# Patient Record
Sex: MALE | Race: WHITE
[De-identification: names, ages, dates, MRNs, and addresses within clinical notes are randomized per-mention and may not be internally consistent; named-entity substitution may affect disease eponyms.]

---

## 2018-03-24 ENCOUNTER — HOSPITAL ENCOUNTER (INPATIENT)
Dept: HOSPITAL 47 - EC | Age: 63
LOS: 3 days | Discharge: HOME | DRG: 246 | End: 2018-03-27
Payer: COMMERCIAL

## 2018-03-24 VITALS — BODY MASS INDEX: 40.6 KG/M2

## 2018-03-24 DIAGNOSIS — I50.23: ICD-10-CM

## 2018-03-24 DIAGNOSIS — Z82.49: ICD-10-CM

## 2018-03-24 DIAGNOSIS — I48.1: ICD-10-CM

## 2018-03-24 DIAGNOSIS — I25.5: ICD-10-CM

## 2018-03-24 DIAGNOSIS — Z79.899: ICD-10-CM

## 2018-03-24 DIAGNOSIS — Z79.82: ICD-10-CM

## 2018-03-24 DIAGNOSIS — E78.5: ICD-10-CM

## 2018-03-24 DIAGNOSIS — I11.9: ICD-10-CM

## 2018-03-24 DIAGNOSIS — Z79.02: ICD-10-CM

## 2018-03-24 DIAGNOSIS — E66.01: ICD-10-CM

## 2018-03-24 DIAGNOSIS — M19.90: ICD-10-CM

## 2018-03-24 DIAGNOSIS — I48.2: ICD-10-CM

## 2018-03-24 DIAGNOSIS — I21.4: Primary | ICD-10-CM

## 2018-03-24 DIAGNOSIS — Z79.01: ICD-10-CM

## 2018-03-24 DIAGNOSIS — G47.30: ICD-10-CM

## 2018-03-24 LAB
ALBUMIN SERPL-MCNC: 3.9 G/DL (ref 3.5–5)
ALP SERPL-CCNC: 113 U/L (ref 38–126)
ALT SERPL-CCNC: 35 U/L (ref 21–72)
ANION GAP SERPL CALC-SCNC: 13 MMOL/L
APTT BLD: 30.6 SEC (ref 22–30)
AST SERPL-CCNC: 104 U/L (ref 17–59)
BASOPHILS # BLD AUTO: 0.1 K/UL (ref 0–0.2)
BASOPHILS NFR BLD AUTO: 1 %
BUN SERPL-SCNC: 17 MG/DL (ref 9–20)
CALCIUM SPEC-MCNC: 9.5 MG/DL (ref 8.4–10.2)
CHLORIDE SERPL-SCNC: 107 MMOL/L (ref 98–107)
CK SERPL-CCNC: 577 U/L (ref 55–170)
CK SERPL-CCNC: 702 U/L (ref 55–170)
CK SERPL-CCNC: 810 U/L (ref 55–170)
CO2 SERPL-SCNC: 24 MMOL/L (ref 22–30)
D DIMER PPP FEU-MCNC: 0.4 MG/L FEU (ref ?–0.6)
EOSINOPHIL # BLD AUTO: 0.1 K/UL (ref 0–0.7)
EOSINOPHIL NFR BLD AUTO: 1 %
ERYTHROCYTE [DISTWIDTH] IN BLOOD BY AUTOMATED COUNT: 5.21 M/UL (ref 4.3–5.9)
ERYTHROCYTE [DISTWIDTH] IN BLOOD: 13.2 % (ref 11.5–15.5)
GLUCOSE SERPL-MCNC: 167 MG/DL (ref 74–99)
HCT VFR BLD AUTO: 43.1 % (ref 39–53)
HGB BLD-MCNC: 15.2 GM/DL (ref 13–17.5)
INR PPP: 2.1 (ref ?–1.2)
LYMPHOCYTES # SPEC AUTO: 1.7 K/UL (ref 1–4.8)
LYMPHOCYTES NFR SPEC AUTO: 18 %
MAGNESIUM SPEC-SCNC: 2 MG/DL (ref 1.6–2.3)
MCH RBC QN AUTO: 29.1 PG (ref 25–35)
MCHC RBC AUTO-ENTMCNC: 35.1 G/DL (ref 31–37)
MCV RBC AUTO: 82.9 FL (ref 80–100)
MONOCYTES # BLD AUTO: 0.7 K/UL (ref 0–1)
MONOCYTES NFR BLD AUTO: 8 %
NEUTROPHILS # BLD AUTO: 7 K/UL (ref 1.3–7.7)
NEUTROPHILS NFR BLD AUTO: 71 %
PLATELET # BLD AUTO: 319 K/UL (ref 150–450)
POTASSIUM SERPL-SCNC: 4.5 MMOL/L (ref 3.5–5.1)
PROT SERPL-MCNC: 7.1 G/DL (ref 6.3–8.2)
PT BLD: 19.1 SEC (ref 9–12)
SODIUM SERPL-SCNC: 144 MMOL/L (ref 137–145)
TROPONIN I SERPL-MCNC: 12.9 NG/ML (ref 0–0.03)
TROPONIN I SERPL-MCNC: 13.2 NG/ML (ref 0–0.03)
TROPONIN I SERPL-MCNC: 19.1 NG/ML (ref 0–0.03)
WBC # BLD AUTO: 9.8 K/UL (ref 3.8–10.6)

## 2018-03-24 PROCEDURE — 80061 LIPID PANEL: CPT

## 2018-03-24 PROCEDURE — 82553 CREATINE MB FRACTION: CPT

## 2018-03-24 PROCEDURE — 93306 TTE W/DOPPLER COMPLETE: CPT

## 2018-03-24 PROCEDURE — 85610 PROTHROMBIN TIME: CPT

## 2018-03-24 PROCEDURE — 80048 BASIC METABOLIC PNL TOTAL CA: CPT

## 2018-03-24 PROCEDURE — 83735 ASSAY OF MAGNESIUM: CPT

## 2018-03-24 PROCEDURE — 93005 ELECTROCARDIOGRAM TRACING: CPT

## 2018-03-24 PROCEDURE — 027035Z DILATION OF CORONARY ARTERY, ONE ARTERY WITH TWO DRUG-ELUTING INTRALUMINAL DEVICES, PERCUTANEOUS APPROACH: ICD-10-PCS

## 2018-03-24 PROCEDURE — B2111ZZ FLUOROSCOPY OF MULTIPLE CORONARY ARTERIES USING LOW OSMOLAR CONTRAST: ICD-10-PCS

## 2018-03-24 PROCEDURE — 93458 L HRT ARTERY/VENTRICLE ANGIO: CPT

## 2018-03-24 PROCEDURE — 85379 FIBRIN DEGRADATION QUANT: CPT

## 2018-03-24 PROCEDURE — 82550 ASSAY OF CK (CPK): CPT

## 2018-03-24 PROCEDURE — 99291 CRITICAL CARE FIRST HOUR: CPT

## 2018-03-24 PROCEDURE — 4A023N7 MEASUREMENT OF CARDIAC SAMPLING AND PRESSURE, LEFT HEART, PERCUTANEOUS APPROACH: ICD-10-PCS

## 2018-03-24 PROCEDURE — 85730 THROMBOPLASTIN TIME PARTIAL: CPT

## 2018-03-24 PROCEDURE — 85025 COMPLETE CBC W/AUTO DIFF WBC: CPT

## 2018-03-24 PROCEDURE — 36415 COLL VENOUS BLD VENIPUNCTURE: CPT

## 2018-03-24 PROCEDURE — 84484 ASSAY OF TROPONIN QUANT: CPT

## 2018-03-24 PROCEDURE — 80053 COMPREHEN METABOLIC PANEL: CPT

## 2018-03-24 RX ADMIN — VERAPAMIL HYDROCHLORIDE ONE ML: 2.5 INJECTION, SOLUTION INTRAVENOUS at 12:20

## 2018-03-24 RX ADMIN — FUROSEMIDE SCH MG: 40 TABLET ORAL at 09:10

## 2018-03-24 RX ADMIN — CEFAZOLIN SCH MLS/HR: 330 INJECTION, POWDER, FOR SOLUTION INTRAMUSCULAR; INTRAVENOUS at 16:35

## 2018-03-24 RX ADMIN — NITROGLYCERIN SCH MLS/HR: 20 INJECTION INTRAVENOUS at 07:13

## 2018-03-24 RX ADMIN — VERAPAMIL HYDROCHLORIDE ONE ML: 2.5 INJECTION, SOLUTION INTRAVENOUS at 11:14

## 2018-03-24 RX ADMIN — THERA TABS SCH EACH: TAB at 09:09

## 2018-03-24 RX ADMIN — ATORVASTATIN CALCIUM SCH MG: 80 TABLET, FILM COATED ORAL at 21:00

## 2018-03-24 RX ADMIN — NITROGLYCERIN ONE MCG: 10 INJECTION INTRAVENOUS at 11:39

## 2018-03-24 RX ADMIN — NITROGLYCERIN SCH MLS: 20 INJECTION INTRAVENOUS at 11:15

## 2018-03-24 RX ADMIN — METOPROLOL TARTRATE SCH MG: 50 TABLET, FILM COATED ORAL at 21:01

## 2018-03-24 RX ADMIN — NITROGLYCERIN ONE MCG: 10 INJECTION INTRAVENOUS at 11:58

## 2018-03-24 NOTE — PTCA
PERCUTANEOUSTRANS CORORONARY ANGIOGRAPHY



ADDENDUM:



PCI PROCEDURE DETAILS:

The patient received only Angiomax infusion, not a bolus.  His INR was 2.1.  He

received 600 mg of Plavix orally.  I used an Xb 3.5 guide catheter.





VENU / LENY: [id number] / Job#: [id number]

MTDD

## 2018-03-24 NOTE — CC
CARDIAC CATHETERIZATION REPORT



DATE OF SERVICE:

03/24/2018.



PROCEDURES:

1. Left heart catheterization and coronary angiography.

2. Percutaneous transluminal coronary angioplasty and stenting of proximal circumflex

    marginal with a drug-eluting stent.

3. Percutaneous transluminal coronary angioplasty and stenting of mid left anterior

    descending coronary artery with a drug-eluting stent.

Moderate conscious sedation time was 72 minutes.  The patient was given Versed and

Benadryl combination.  His vital signs, EKG and oxygen saturation were monitored

closely.



CLINICAL INFORMATION:

Mr. Bradley Pedro is a 63-year-old gentleman, a  in [facility], a

patient of Dr. Jeronimo, who has chronic atrial fibrillation and probably some

cardiomyopathy-type picture, has been on Coumadin and Cardizem for rate control and

anticoagulation.  He has been doing well, but he came into the hospital with discomfort

in the chest and left armpit area, went to [facility], was transferred

here because of elevated troponin suggestive of non-ST elevation MI.  He was in atrial

fibrillation with a moderate ventricular rate, hemodynamically stable.  He was advised

a coronary angiography.  His INR was 2.1 and he was advised the procedure from the

radial approach and Dr. Dow saw and evaluated the patient and requested me to perform

coronary angiography, given the fact a radial approach would be better with a high INR.

I saw the patient, spoke to him at length regarding risks, benefits, options and

rationale and then proceeded with the procedure.



PROCEDURE NOTE:

Under local anesthesia and strict aseptic precautions, a 6-German introducer was placed

in the right radial artery.  Using an Ultimate 1 catheter, I performed selective

coronary angiography and using a pigtail catheter, I checked LV pressures, but did not

perform an LV-gram.  I noted that there was a significant disease in the circumflex

marginal which was subtotally occluded and also a mid LAD which had a high-grade lesion

and was a large vessel.  I recommend intervention in the same setting.  LV-gram was not

performed.



CARDIAC CATHETERIZATION FINDINGS:

The left ventricular end-diastolic pressure was about 20 mmHg without any gradient

across the aortic valve.



CORONARY ANGIOGRAPHY FINDINGS:

Right coronary artery:  Large dominant vessel, has minor irregularities, no more than

30% narrowing, distally bifurcates into a larger PDA.  A smaller PLV supplies a fair

amount of myocardium.  No significant disease.  RCA is a very dominant vessel.



Left main coronary artery:  Short, patent, disease-free vessel that bifurcates into LAD

and circumflex.



Left anterior descending coronary artery:  Good-caliber vessel, extends along the

anterior wall, gives off a small 1st diagonal and a large 2nd diagonal.  Immediately

after the large 2nd diagonal, there is an eccentric 80%-90% stenosis in a very

calcified area.  The caliber of the vessel then improves.  It runs all the way to the

apex supplying a sizable amount of myocardium.  It gives off several septal branches

and there are minor irregularities throughout the LAD.  The diagonal branch that comes

off at and above it and just before the lesion has about a 30%-40% narrowing.



Left posterior circumflex coronary artery:  Nondominant vessel, gives off a single

obtuse marginal that has a very proximal stenosis of about 99% with very sluggish flow

and the vessel appears to be subtotally occluded and this may be the culprit vessel

responsible for the patient's non-ST elevation MI.  Just at the origin of this obtuse

marginal branch, the circumflex continues and gives off a left atrial circumflex that

is quite large and supplies a sizable amount of myocardium.  Distal branches of the

obtuse marginal appear to be small in caliber and fair to moderate in distribution.

The circumflex marginal therefore has a significant critical lesion.



FINAL IMPRESSION:

This patient has an 80%-90% mid left anterior descending and a 95%-99% proximal

circumflex marginal of a nondominant vessel.  Right coronary artery, which is dominant,

does not have significant disease.  Filling pressures are elevated.  Left

ventriculogram was not performed.



RECOMMENDATION:

I proceeded to perform PCI of circumflex initially and then LAD.





MMODL / IJN: [id number] / Job#: [id number]

## 2018-03-24 NOTE — LTR
Dear Dr. Stromberg:



Thank you for for allowing me to participate in the in the care of Mr. Bradley Pedro.

Please find enclosed my detailed cardiac cath report for your records.  This gentleman

presented with a non-ST elevation MI and underwent prompt stenting of circumflex and

LAD with a good result.  I expect he will be discharged in the next 36-48 hours.



Thank you for your referral and please do call for questions.





MMODL / IJN: [id number] / Job#: [id number]

## 2018-03-24 NOTE — P.HPIM
History of Present Illness


H&P Date: 03/24/18


Chief Complaint: chest pain





This  is a 63-year-old patient well known to me who was transferred  from [facility] where he had gone tonight after he developed chest pain. He 

had eaten 4 [place] and then noticed that he was having pain to the left 

chest.  He told the ER doctor the pain was aching, constant, moderate 

intensity.  It was accompanied by some dyspnea, palpitations, and nausea.  He 

took 2 aspirin.  While at [facility] he was noted to be in atrial 

fibrillation with rapid ventricular rate, and he also had a minimally elevated 

troponin at 0.4.  He was given nitroglycerin and the pain did resolve.  The 

patient states that he has been symptom-free.  He is not having pain, dyspnea, 

nausea or other symptoms.  He was transferred here to [facility].  He 

is found to have a troponin of 13+ at that time.  Dr. Dow had consult on him.

  He has since been moved to the Cath Lab for emergent procedure.








Review of Systems


All systems: negative





Past Medical History


Past Medical History: Atrial Fibrillation, Hypertension, Osteoarthritis (OA), 

Pneumonia, Sleep Apnea/CPAP/BIPAP


Additional Past Medical History / Comment(s): BLOOD CLOT IN HEART 2013. USES 

CPAP FOR SLEEP APNEA


History of Any Multi-Drug Resistant Organisms: None Reported


Past Surgical History: Orthopedic Surgery


Additional Past Surgical History / Comment(s): CARDIOVERSION ATTEMPTED 2/2013. 

LT KNEE ARTHROSCOPY


Past Anesthesia/Blood Transfusion Reactions: No Reported Reaction


Past Psychological History: No Psychological Hx Reported


Smoking Status: Never smoker


Past Alcohol Use History: Rare


Past Drug Use History: None Reported





Medications and Allergies


 Home Medications











 Medication  Instructions  Recorded  Confirmed  Type


 


Furosemide [Lasix] 40 mg PO DAILY 05/28/14 03/24/18 History


 


Multivitamin [Men's Multi-Vitamin] 1 tab PO DAILY 05/28/14 03/24/18 History


 


Warfarin [Coumadin] 5 mg PO SUTUFR 05/28/14 03/24/18 History


 


Diltiazem HCl [Diltiazem ER] 360 mg PO DAILY 03/24/18 03/24/18 History


 


L.acidoph,Paracasei, B.lactis 1 cap PO DAILY 03/24/18 03/24/18 History





[Probiotic]    


 


Omega-3 Fatty Acids/Fish Oil [Fish 4,000 mg PO DAILY 03/24/18 03/24/18 History





Oil 1,000 mg Softgel]    


 


Warfarin [Coumadin] 2.5 mg PO MOWETHSA 03/24/18 03/24/18 History











 Allergies











Allergy/AdvReac Type Severity Reaction Status Date / Time


 


No Known Allergies Allergy   Verified 03/24/18 10:51














Physical Exam


Vitals: 


 Vital Signs











  Temp Pulse Resp BP Pulse Ox


 


 03/24/18 10:37  98 F  109 H  18  148/82  97


 


 03/24/18 08:30  97.8 F  96  12  139/89  96


 


 03/24/18 07:15   106 H  18  142/73  97


 


 03/24/18 06:32   91  20  134/78  95


 


 03/24/18 03:48  98.2 F  92  20  153/105  96








 Intake and Output











 03/23/18 03/24/18 03/24/18





 22:59 06:59 14:59


 


Output Total   300


 


Balance   -300


 


Output:   


 


  Urine   300


 


Other:   


 


  Weight  136.078 kg 











Exam was deferred to Dr. ROD Lynn, as he is currently in the Cath Lab 

undergoing procedure.  I did speak with the patient personally but my exam was 

deferred at this time.








Results


CBC & Chem 7: 


 03/24/18 04:11





 03/24/18 04:11


Labs: 


 Abnormal Lab Results - Last 24 Hours (Table)











  03/24/18 03/24/18 03/24/18 Range/Units





  04:11 04:11 04:11 


 


PT    19.1 H  (9.0-12.0)  sec


 


INR    2.1 H  (<1.2)  


 


APTT    30.6 H  (22.0-30.0)  sec


 


Glucose   167 H   (74-99)  mg/dL


 


AST   104 H   (17-59)  U/L


 


Total Creatine Kinase  702 H    ()  U/L


 


CK-MB (CK-2)  66.7 H*    (0.0-2.4)  ng/mL


 


Troponin I  13.200 H*    (0.000-0.034)  ng/mL














  03/24/18 Range/Units





  09:37 


 


PT   (9.0-12.0)  sec


 


INR   (<1.2)  


 


APTT   (22.0-30.0)  sec


 


Glucose   (74-99)  mg/dL


 


AST   (17-59)  U/L


 


Total Creatine Kinase  810 H  ()  U/L


 


CK-MB (CK-2)  74.2 H*  (0.0-2.4)  ng/mL


 


Troponin I  19.100 H*  (0.000-0.034)  ng/mL











Comments: 


EKG was reviewed.








Assessment and Plan


Plan: 


Acute non-ST segment elevation myocardial infarction: He is currently 

undergoing cardiac catheterization to evaluate this.  I'll defer to cardiology 

for their further recommendations.  Troponins were 13.2 in the 19.1.


Chronic atrial fibrillation: He remains on Coumadin.  Cardiology decide whether 

he should remain on this be changed to heparin drip at this time.  His INR was 

therapeutic at 2.1.


Hypertension


Long-term Coumadin anticoagulation





I'll defer to cardiology at this time.  I'll reevaluate him in next 24 hours.  I

'll wait on his upcoming procedure.

## 2018-03-24 NOTE — ED
Chest Pain HPI





- General


Chief Complaint: Chest Pain


Stated Complaint: Chest Pain


Time Seen by Provider: 03/24/18 03:40


Source: patient


Mode of arrival: EMS


Limitations: no limitations





- History of Present Illness


Initial Comments: 





This patient is a 63-year-old man who is transferred here from [facility] where he had gone tonight after he developed chest pain.  The patient 

reports she was in usual state of health until between 9 and 10 PM.  He had 

eaten [place] and then noticed that he was having pain to the lateral left 

chest in the midaxillary line, just under the axilla.  He describes as an aching

, constant, moderate intensity.  It was accompanied by some dyspnea, 

palpitations, and nausea.  He took 2 aspirin.  The patient went to the other 

hospital where he was noted to be in atrial fibrillation with rapid ventricular 

rate, and he also had a minimally elevated troponin at 0.4.  The patient was 

started on a nitroglycerin drip, had rate control, and he states that his pain 

did resolve.  The patient states that he has been symptom-free.  He is not 

having pain, dyspnea, nausea or other symptoms.


MD Complaint: chest pain


-: hour(s)


Onset: after eating


Pain Location: left chest


Pain Radiation: LUE


Severity: moderate


Quality: aching


Consistency: now resolved


Improves With: nitroglycerin, other


Worsens With: nothing


Anginal Symptoms: nausea, dyspnea


Treatments Prior to Arrival: aspirin, nitroglycerin, oxygen





- Related Data


 Home Medications











 Medication  Instructions  Recorded  Confirmed


 


Furosemide [Lasix] 40 mg PO DAILY 05/28/14 03/24/18


 


Multivitamin [Men's Multi-Vitamin] 1 tab PO DAILY 05/28/14 03/24/18


 


Warfarin [Coumadin] 5 mg PO SUTUFR 05/28/14 03/24/18


 


Diltiazem HCl [Diltiazem ER] 360 mg PO DAILY 03/24/18 03/24/18


 


L.acidoph,Paracasei, B.lactis 1 cap PO DAILY 03/24/18 03/24/18





[Probiotic]   


 


Omega-3 Fatty Acids/Fish Oil [Fish 4,000 mg PO DAILY 03/24/18 03/24/18





Oil 1,000 mg Softgel]   


 


Warfarin [Coumadin] 2.5 mg PO MOWETHSA 03/24/18 03/24/18











 Allergies











Allergy/AdvReac Type Severity Reaction Status Date / Time


 


No Known Allergies Allergy   Verified 03/24/18 10:51














Review of Systems


ROS Statement: 


Those systems with pertinent positive or pertinent negative responses have been 

documented in the HPI.





ROS Other: All systems not noted in ROS Statement are negative.


Constitutional: Denies: fever, chills, weakness


Respiratory: Reports: dyspnea.  Denies: cough, wheezes


Cardiovascular: Reports: chest pain, palpitations.  Denies: orthopnea, edema, 

syncope


Gastrointestinal: Reports: nausea.  Denies: abdominal pain, vomiting, diarrhea


Genitourinary: Denies: dysuria, hematuria


Musculoskeletal: Denies: back pain


Skin: Denies: rash


Neurological: Denies: headache, weakness, numbness


Hematological/Lymphatic: Denies: easy bleeding





EKG Findings





- EKG Results:


EKG: interpreted by ERMD


EKG shows: atrial fibrillation (Rate approximately 93 bpm)





- Blocks, Axis, Hypertrophy, ST Abn:


AV and intraventricular conduction: right bundle branch block (fixed/

intermittent, complete/incomplete)


Repolarization changes or abnormalities: ST or T wave suggestive of ischemia (

Lateral leads)





- MI, Pacemaker, Normal:


Myocardial infarction: inferior MI (old age indeterminate)





Past Medical History


Past Medical History: Atrial Fibrillation, Hypertension, Osteoarthritis (OA), 

Pneumonia, Sleep Apnea/CPAP/BIPAP


Additional Past Medical History / Comment(s): BLOOD CLOT IN HEART 2013. USES 

CPAP FOR SLEEP APNEA


History of Any Multi-Drug Resistant Organisms: None Reported


Past Surgical History: Orthopedic Surgery


Additional Past Surgical History / Comment(s): CARDIOVERSION ATTEMPTED 2/2013. 

LT KNEE ARTHROSCOPY


Past Anesthesia/Blood Transfusion Reactions: No Reported Reaction


Past Psychological History: No Psychological Hx Reported


Smoking Status: Never smoker


Past Alcohol Use History: Rare


Past Drug Use History: None Reported





- Past Family History


  ** Father


Family Medical History: Coronary Artery Disease (CAD), Osteoarthritis (OA)





General Exam


Limitations: no limitations


General appearance: alert, in no apparent distress, obese


Head exam: Present: atraumatic, normocephalic


Eye exam: Present: normal appearance.  Absent: scleral icterus, conjunctival 

injection


ENT exam: Present: normal oropharynx


Respiratory exam: Present: normal lung sounds bilaterally.  Absent: respiratory 

distress, wheezes, rales, rhonchi, stridor


Cardiovascular Exam: Present: irregular rhythm, normal heart sounds.  Absent: 

systolic murmur, diastolic murmur, rubs, gallop


GI/Abdominal exam: Present: soft.  Absent: distended, tenderness, guarding, 

rebound, mass


Extremities exam: Present: normal inspection, normal capillary refill.  Absent: 

pedal edema, calf tenderness


Back exam: Present: normal inspection.  Absent: CVA tenderness (R), CVA 

tenderness (L)


Neurological exam: Present: alert


Skin exam: Present: warm, dry, intact, normal color.  Absent: rash





Course


 Vital Signs











  03/24/18 03/24/18 03/24/18





  03:48 06:32 07:15


 


Temperature 98.2 F  


 


Pulse Rate 92 91 106 H


 


Pulse Rate [   





Cardiac Monitor   





]   


 


Respiratory 20 20 18





Rate   


 


Blood Pressure 153/105 134/78 142/73


 


Blood Pressure   





[Left Arm]   


 


O2 Sat by Pulse 96 95 97





Oximetry   














  03/24/18 03/24/18 03/24/18





  08:30 09:42 10:37


 


Temperature 97.8 F 98.1 F 98 F


 


Pulse Rate 96  109 H


 


Pulse Rate [  98 





Cardiac Monitor   





]   


 


Respiratory 12 18 18





Rate   


 


Blood Pressure 139/89  148/82


 


Blood Pressure  131/65 





[Left Arm]   


 


O2 Sat by Pulse 96 96 97





Oximetry   














Chest Pain [facility]





- [facility]





Patient is 63-year-old man with history of atrial fibrillation, who had episode 

of chest pain associated with atrial fibrillation and a rapid ventricular rate.

  He presented to the outside hospital, where he had rate control and was 

symptom-free when transferred here.  His second troponin is 13.2, and I 

discussed his case with cardiology and there treatment recommendations are 

incorporated.  No heparin as his Coumadin is therapeutic.  He remains symptom 

free through his course in emergency department.





Critical Care Time


Critical Care Time: Yes (35 minutes)





Disposition


Clinical Impression: 


 NSTEMI (non-ST elevated myocardial infarction)





Disposition: ADMITTED AS IP TO THIS HOSP


Condition: Serious

## 2018-03-24 NOTE — CONS
CONSULTATION



CHIEF COMPLAINT:

Chest pain.



HISTORY OF PRESENT ILLNESS:

Bradley is a 63-year-old gentleman with history of chronic atrial fibrillation, who

presented to [facility] Emergency room with some discomfort in the

axillary area.  It was moderate in intensity, described as an aching without radiation

to neck, arm or back.  He was in atrial fibrillation with rapid ventricular rate and

after the initial evaluation and treatment by the  ER doctor, the heart rate was better

controlled.  He was actually thinking of sending him home when the 2nd set of troponin

came back minimally elevated due to which he was transferred to [facility]

where the subsequent troponin came back at 13.  The patient has remained chest pain-

free and hemodynamically stable through the night and at the time of my evaluation this

morning, he is pain-free and free of symptoms.



PAST MEDICAL HISTORY:

Significant for chronic atrial fibrillation.



MEDICATIONS:

Include Cardizem, aspirin, Lasix, and Coumadin.



ALLERGIES:

There are no known drug allergies.



FAMILY HISTORY:

Negative for premature coronary artery disease.



SOCIAL HISTORY:

Negative for smoking, ETOH abuse or drug abuse.



REVIEW OF SYSTEMS:

HEENT is unremarkable.  Cardiac as described above.  Respiratory negative.  GI

negative.  Genitourinary negative. Allergy none.  Skin negative.  Musculoskeletal

significant for arthritis.  Psychosocial negative.  Endocrine: Negative.

Derm: Negative. CONSTITUTIONAL:  Negative.

ONCOLOGICAL: Negative.  Rest of the systems review is not relevant.



PHYSICAL EXAM:

Heart rate is 96,  blood pressure of 139/89, afebrile, O2 sat is 96%.  There is no

jugular venous distention.  Chest exam reveals good air entry bilaterally.  Heart exam

reveals first and second heart sounds, irregular rhythm.  No murmur.

ABDOMEN:  Soft.  Exam of the extremities did not reveal any edema.  Peripheral pulses

are palpable.



LAB:

Hemoglobin of 15.2, platelet count is 319. INR is 2.1.  Potassium is 4.5, creatinine is

0.8.  , CK-MB 66, troponin is elevated at 13.2.  EKG shows atrial fibrillation

with nonspecific ST-T wave changes and evidence of prior inferior wall myocardial

infarction.  An echocardiogram shows apical hypokinesis.



ASSESSMENT:

1. Acute non ST-segment elevation myocardial infarction.

2. Chronic atrial fibrillation.



PLAN:

The patient will undergo cardiac catheterization this morning.  We will do a radial

cath on him.  He is at increased risk for procedure related bleeding given the Coumadin

that he is on, but I explained this to the patient and we decided to proceed with a

cath this morning as we were concerned that he may have a completely occluded vessel.





VENU / CHELSEYN: [id number] / Job#: [id number]

## 2018-03-24 NOTE — ECHOF
Referral Reason:NSTEMI



MEASUREMENTS

--------

HEIGHT: 182.9 cm

WEIGHT: 136.1 kg

BP: 148/82

IVSd:   1.2 cm     (0.6 - 1.1)

LVIDd:   4.9 cm     (3.9 - 5.3)

LVPWd:   0.9 cm     (0.6 - 1.1)

IVSs:   1.2 cm

LVIDs:   4.0 cm

LVPWs:   0.9 cm

LA Diam:   4.3 cm     (2.7 - 3.8)

Ao Diam:   3.5 cm     (2.0 - 3.7)

AV Cusp:   2.4 cm     (1.5 - 2.6)

LA Diam:   4.8 cm     (2.7 - 3.8)

MV EXCURSION:   28.850 mm     (> 18.000)

MV EF SLOPE:   106 mm/s     (70 - 150)

EPSS:   0.9 cm

MV E Roque:   1.11 m/s

MV DecT:   97 ms

MV A Roque:   0.23 m/s

MV E/A Ratio:   4.83 

RAP:   5.00 mmHg

RVSP:   20.01 mmHg







FINDINGS

--------

Atrial fibrillation.

Morbid Obesity

This was a techncally difficult study with suboptimal views, , Lumason utilized for enhancement of im
ages.

The left ventricular size is normal.   There is mild concentric left ventricular hypertrophy.   Overa
ll left ventricular systolic function is moderate-severely impaired with, an EF between 30 - 35 %.   
Anterseptal Hypokinesis   Adrian Hypokinesis.   Basal septal hypokinesis

The right ventricle is normal in size.

The left atrium is moderately dilated.

The right atrial size is normal.

5.0mg OF Lumason UTLIZED: 2 OR MORE WALL SEGMENTS NOT VISUALIZED.

There is mild aortic valve sclerosis.   There is no evidence of aortic regurgitation.

Mild mitral annular calcification present.   Mild mitral regurgitation is present.

Mild tricuspid regurgitation present.   There is no evidence of pulmonary hypertension.   The right v
entricular systolic pressure, as measured by Doppler, is 20.01mmHg.

There is no pulmonic regurgitation present.

The aortic root size is normal.

There is no pericardial effusion.



CONCLUSIONS

--------

1. Morbid Obesity

2. This was a techncally difficult study with suboptimal views, , Lumason utilized for enhancement of
 images.

3. The left ventricular size is normal.

4. There is mild concentric left ventricular hypertrophy.

5. Overall left ventricular systolic function is moderate-severely impaired with, an EF between 30 - 
35 %.

6. Anterseptal Hypokinesis

7. Adrian Hypokinesis.

8. Basal septal hypokinesis

9. The left atrium is moderately dilated.

10. 5.0mg OF Lumason UTLIZED: 2 OR MORE WALL SEGMENTS NOT VISUALIZED.

11. There is mild aortic valve sclerosis.

12. Mild mitral annular calcification present.

13. Mild mitral regurgitation is present.

14. Mild tricuspid regurgitation present.

15. There is no evidence of pulmonary hypertension.

16. The right ventricular systolic pressure, as measured by Doppler, is 20.01mmHg.

17. There is no pulmonic regurgitation present.

18. The aortic root size is normal.

19. There is no pericardial effusion.





SONOGRAPHER: Marii Grayson RDCS

## 2018-03-25 LAB
ANION GAP SERPL CALC-SCNC: 13 MMOL/L
BASOPHILS # BLD AUTO: 0.1 K/UL (ref 0–0.2)
BASOPHILS NFR BLD AUTO: 1 %
BUN SERPL-SCNC: 16 MG/DL (ref 9–20)
CALCIUM SPEC-MCNC: 9.4 MG/DL (ref 8.4–10.2)
CHLORIDE SERPL-SCNC: 105 MMOL/L (ref 98–107)
CHOLEST SERPL-MCNC: 172 MG/DL (ref ?–200)
CO2 SERPL-SCNC: 21 MMOL/L (ref 22–30)
EOSINOPHIL # BLD AUTO: 0.3 K/UL (ref 0–0.7)
EOSINOPHIL NFR BLD AUTO: 3 %
ERYTHROCYTE [DISTWIDTH] IN BLOOD BY AUTOMATED COUNT: 5.29 M/UL (ref 4.3–5.9)
ERYTHROCYTE [DISTWIDTH] IN BLOOD: 13.6 % (ref 11.5–15.5)
GLUCOSE SERPL-MCNC: 117 MG/DL (ref 74–99)
HCT VFR BLD AUTO: 45.2 % (ref 39–53)
HDLC SERPL-MCNC: 34 MG/DL (ref 40–60)
HGB BLD-MCNC: 14.3 GM/DL (ref 13–17.5)
LDLC SERPL CALC-MCNC: 110 MG/DL (ref 0–99)
LYMPHOCYTES # SPEC AUTO: 1.8 K/UL (ref 1–4.8)
LYMPHOCYTES NFR SPEC AUTO: 21 %
MCH RBC QN AUTO: 27.1 PG (ref 25–35)
MCHC RBC AUTO-ENTMCNC: 31.7 G/DL (ref 31–37)
MCV RBC AUTO: 85.5 FL (ref 80–100)
MONOCYTES # BLD AUTO: 0.7 K/UL (ref 0–1)
MONOCYTES NFR BLD AUTO: 8 %
NEUTROPHILS # BLD AUTO: 5.7 K/UL (ref 1.3–7.7)
NEUTROPHILS NFR BLD AUTO: 65 %
PLATELET # BLD AUTO: 316 K/UL (ref 150–450)
POTASSIUM SERPL-SCNC: 4.6 MMOL/L (ref 3.5–5.1)
SODIUM SERPL-SCNC: 139 MMOL/L (ref 137–145)
TRIGL SERPL-MCNC: 139 MG/DL (ref ?–150)
WBC # BLD AUTO: 8.8 K/UL (ref 3.8–10.6)

## 2018-03-25 RX ADMIN — ASPIRIN SCH: 325 TABLET ORAL at 09:22

## 2018-03-25 RX ADMIN — CLOPIDOGREL BISULFATE SCH MG: 75 TABLET ORAL at 07:48

## 2018-03-25 RX ADMIN — METOPROLOL TARTRATE SCH MG: 50 TABLET, FILM COATED ORAL at 20:36

## 2018-03-25 RX ADMIN — RIVAROXABAN SCH MG: 10 TABLET, FILM COATED ORAL at 07:49

## 2018-03-25 RX ADMIN — CEFAZOLIN SCH: 330 INJECTION, POWDER, FOR SOLUTION INTRAMUSCULAR; INTRAVENOUS at 17:19

## 2018-03-25 RX ADMIN — ATORVASTATIN CALCIUM SCH MG: 80 TABLET, FILM COATED ORAL at 20:36

## 2018-03-25 RX ADMIN — ASPIRIN 81 MG CHEWABLE TABLET SCH MG: 81 TABLET CHEWABLE at 07:48

## 2018-03-25 RX ADMIN — METOPROLOL TARTRATE SCH MG: 50 TABLET, FILM COATED ORAL at 07:48

## 2018-03-25 RX ADMIN — FUROSEMIDE SCH MG: 40 TABLET ORAL at 07:49

## 2018-03-25 RX ADMIN — LOSARTAN POTASSIUM AND HYDROCHLOROTHIAZIDE SCH EACH: 12.5; 5 TABLET ORAL at 10:50

## 2018-03-25 RX ADMIN — THERA TABS SCH EACH: TAB at 10:51

## 2018-03-25 NOTE — PN
PROGRESS NOTE



Bradley is a 71-year-old gentleman who was admitted to hospital with non ST-segment

elevation MI.  Underwent cardiac catheterization, angioplasty of mid LAD and native

circumflex coronary artery.  This morning, patient is doing well and is free of

symptoms.  I had a long conversation with the patient about his condition, prognosis

and long-term care and plans.  He is currently on aspirin, Plavix, Lipitor, Lopressor,

Hyzaar, sublingual nitroglycerin on a p.r.n. basis, Xarelto 10 mg daily.  The patient

had an echo yesterday that showed that showed there was moderate to severely impaired

LV systolic function with an ejection fraction of 30-35%.



PHYSICAL EXAMINATION:

The patient is free of chest pain this morning.  On exam, he is comfortable at rest.

Vital signs are stable.  There is no jugular venous distention.  Chest exam reveals

good air entry bilaterally.  Heart exam reveals first and second heart sounds,

irregular rhythm.  No murmur.  Radial artery access site appears normal.  The patient

is in atrial fibrillation.  There is no jugular venous distention.  Carotid upstroke is

normal.  There is no bruit.  Chest exam reveals good air entry bilaterally.  Heart exam

reveals first and second heart sounds.  No gallop.  Exam of the extremities did not

reveal any edema.  Peripheral pulses are felt.



LAB:

Showed that the hemoglobin is 14.3, platelet count is 313.  Potassium is 4.6,

creatinine is 0.9.  The peak troponin was 19.  LDL cholesterol is 110.



ASSESSMENT:

1. Acute non ST-segment elevation myocardial infarction.

2. Ischemic cardiomyopathy.

3. Chronic atrial fibrillation with controlled ventricular rate.



PLAN:

Patient is doing well.  We will continue with the current medications reviewed echo

findings with the patient.  We can probably discharge him home tomorrow to follow up

with Dr. Jeronimo, his primary cardiologist.





MMODL / IJN: [id number] / Job#: [id number]

## 2018-03-25 NOTE — P.PN
Subjective








This  is a 63-year-old patient well known to me who was transferred  from [facility] where he had gone tonight after he developed chest pain. He 

had eaten 4 White Castles and then noticed that he was having pain to the left 

chest.  He told the ER doctor the pain was aching, constant, moderate 

intensity.  It was accompanied by some dyspnea, palpitations, and nausea.  He 

took 2 aspirin.  While at [facility] he was noted to be in atrial 

fibrillation with rapid ventricular rate, and he also had a minimally elevated 

troponin at 0.4.  He was given nitroglycerin and the pain did resolve.  The 

patient states that he has been symptom-free.  He is not having pain, dyspnea, 

nausea or other symptoms.  He was transferred here to [facility].  He 

is found to have a troponin of 13+ at that time.  Dr. Dow had consult on him.

  He has since been moved to the Cath Lab for emergent procedure.





03/25/2018





Is feeling well today.  He denies any chest pains, pressures, shortness of 

breath, nausea, vomiting.  He is tolerating a regular diet.  He is status post 

cardiac catheterization with stenting of proximal circumflex marginal artery 

and mid left anterior descending artery.  He has a history of chronic atrial 

fibrillation which is on Coumadin for.  Cardiologist changed him now to 

Xarelto.  He is on aspirin and Plavix.  He is now started on Lipitor 80 mg 

daily.





Objective





- Vital Signs


Vital signs: 


 Vital Signs











Temp  97.3 F L  03/25/18 12:00


 


Pulse  99   03/25/18 12:00


 


Resp  16   03/25/18 12:00


 


BP  107/62   03/25/18 12:00


 


Pulse Ox  96   03/25/18 12:00








 Intake & Output











 03/24/18 03/25/18 03/25/18





 18:59 06:59 18:59


 


Intake Total 980  236


 


Output Total 1825 0 


 


Balance -845 0 236


 


Weight 136.36 kg 139.1 kg 


 


Intake:   


 


    


 


  Oral 480  236


 


Output:   


 


  Urine 1825 0 


 


  Stool 0 0 


 


Other:   


 


  Voiding Method Toilet Toilet 


 


  # Voids 0 0 1


 


  # Bowel Movements 0  














- Exam


General:  The patient is awake and alert, in no distress, and does not appear 

acutely ill. 


Neck:  The neck is supple, there is no thyromegaly, lymphadenopathy, tenderness

  or JVD.  


Cardiovascular:  S1S2 is normal, There is a regular rate and rhythm. No murmur, 

rub or gallop is appreciated.


Respiratory:  Lungs are clear to auscultation bilaterally,  respirations are non

-labored, breath sounds are equal.  


Gastrointestinal:  Soft, non-distended, non-tender abdomen without masses or 

organomegaly noted. There is no rebound or guarding present. Bowel sounds are 

unremarkable. 


Musculoskeletal:  Normal ROM, no tenderness, There is no pedal edema. There is 

no calf tenderness or swelling. No cords were appreciated.  


Neurological:  CN II-XII intact, there are no obvious motor or sensory 

deficits. Coordination appears grossly intact. Speech is normal.


Skin:  Skin is warm and dry and no rashes or lesions are noted. 











- Labs


CBC & Chem 7: 


 03/25/18 05:15





 03/25/18 05:15


Labs: 


 Abnormal Lab Results - Last 24 Hours (Table)











  03/24/18 03/25/18 Range/Units





  16:36 05:15 


 


Carbon Dioxide   21 L  (22-30)  mmol/L


 


Glucose   117 H  (74-99)  mg/dL


 


Total Creatine Kinase  577 H   ()  U/L


 


CK-MB (CK-2)  44.7 H*   (0.0-2.4)  ng/mL


 


Troponin I  12.900 H*   (0.000-0.034)  ng/mL


 


LDL Cholesterol, Calc   110 H  (0-99)  mg/dL


 


HDL Cholesterol   34 L  (40-60)  mg/dL














Assessment and Plan


Plan: 


Acute non-ST segment elevation myocardial infarction status post stenting of 

the proximal circumflex marginal artery and left anterior descending artery he 

will continue aspirin, Plavix, and now Xarelto in place of Coumadin. continue 

metoprolol, Hyzaar, Lipitor  


Chronic atrial fibrillation: He is now on Xarelto. 


Ischemic cardiomyopathy: Medications as above.


Hypertension: He'll continue metoprolol and Hyzaar


Long-term anticoagulation: Switch from Coumadin now Xarelto.  Await on 

discharge planning to determine if his insurance covers it on Monday morning.





He is much improved.  We'll await further invasive cardiology.  Weight on 

insurance coverage issues for Xarelto.  Reevaluate next 24 hours.

## 2018-03-26 VITALS — RESPIRATION RATE: 18 BRPM

## 2018-03-26 LAB
ANION GAP SERPL CALC-SCNC: 9 MMOL/L
BASOPHILS # BLD AUTO: 0.1 K/UL (ref 0–0.2)
BASOPHILS NFR BLD AUTO: 1 %
BUN SERPL-SCNC: 20 MG/DL (ref 9–20)
CALCIUM SPEC-MCNC: 9.3 MG/DL (ref 8.4–10.2)
CHLORIDE SERPL-SCNC: 104 MMOL/L (ref 98–107)
CO2 SERPL-SCNC: 26 MMOL/L (ref 22–30)
EOSINOPHIL # BLD AUTO: 0.4 K/UL (ref 0–0.7)
EOSINOPHIL NFR BLD AUTO: 4 %
ERYTHROCYTE [DISTWIDTH] IN BLOOD BY AUTOMATED COUNT: 5.06 M/UL (ref 4.3–5.9)
ERYTHROCYTE [DISTWIDTH] IN BLOOD: 13.3 % (ref 11.5–15.5)
GLUCOSE SERPL-MCNC: 106 MG/DL (ref 74–99)
HCT VFR BLD AUTO: 42.5 % (ref 39–53)
HGB BLD-MCNC: 13.8 GM/DL (ref 13–17.5)
LYMPHOCYTES # SPEC AUTO: 1.8 K/UL (ref 1–4.8)
LYMPHOCYTES NFR SPEC AUTO: 21 %
MCH RBC QN AUTO: 27.2 PG (ref 25–35)
MCHC RBC AUTO-ENTMCNC: 32.4 G/DL (ref 31–37)
MCV RBC AUTO: 84 FL (ref 80–100)
MONOCYTES # BLD AUTO: 0.7 K/UL (ref 0–1)
MONOCYTES NFR BLD AUTO: 8 %
NEUTROPHILS # BLD AUTO: 5.3 K/UL (ref 1.3–7.7)
NEUTROPHILS NFR BLD AUTO: 63 %
PLATELET # BLD AUTO: 287 K/UL (ref 150–450)
POTASSIUM SERPL-SCNC: 4.4 MMOL/L (ref 3.5–5.1)
SODIUM SERPL-SCNC: 139 MMOL/L (ref 137–145)
WBC # BLD AUTO: 8.4 K/UL (ref 3.8–10.6)

## 2018-03-26 RX ADMIN — THERA TABS SCH EACH: TAB at 09:40

## 2018-03-26 RX ADMIN — METOPROLOL TARTRATE SCH MG: 50 TABLET, FILM COATED ORAL at 20:22

## 2018-03-26 RX ADMIN — CLOPIDOGREL BISULFATE SCH MG: 75 TABLET ORAL at 09:39

## 2018-03-26 RX ADMIN — RIVAROXABAN SCH MG: 10 TABLET, FILM COATED ORAL at 09:40

## 2018-03-26 RX ADMIN — ATORVASTATIN CALCIUM SCH MG: 80 TABLET, FILM COATED ORAL at 20:22

## 2018-03-26 RX ADMIN — METOPROLOL TARTRATE SCH MG: 50 TABLET, FILM COATED ORAL at 09:40

## 2018-03-26 RX ADMIN — FUROSEMIDE SCH MG: 40 TABLET ORAL at 09:39

## 2018-03-26 RX ADMIN — ASPIRIN SCH: 325 TABLET ORAL at 09:40

## 2018-03-26 RX ADMIN — LOSARTAN POTASSIUM AND HYDROCHLOROTHIAZIDE SCH EACH: 12.5; 5 TABLET ORAL at 09:39

## 2018-03-26 RX ADMIN — ASPIRIN 81 MG CHEWABLE TABLET SCH MG: 81 TABLET CHEWABLE at 09:39

## 2018-03-26 NOTE — P.PN
Subjective


Progress Note Date: 03/26/18


Principal diagnosis: 





Non-STEMI


This pleasant 63-year-old  gentleman with history of chronic 

persistent atrial fibrillation, who presented to the hospital with a non-ST 

elevation myocardial infarction.  He was taken to the cardiac catheterization 

lab, and subsequently underwent stenting of the LAD and circumflex artery.  

Echocardiogram with Doppler study was performed which revealed an ejection 

fraction of 30-35%.  Patient was seen and examined this morning, blood pressure 

122/60, heart rate in the 80s.  White blood cell count 8.4, hemoglobin 13.8, 

sodium 139, potassium 4.4, BUN 20, creatinine 0.8.  Troponin level peaked at 

19.1.  He has been up ambulating in the hallway this morning, denies any chest 

discomfort, breathing overall has been stable.








Objective





- Vital Signs


Vital signs: 


 Vital Signs











Temp  97.7 F   03/26/18 04:00


 


Pulse  95   03/26/18 09:42


 


Resp  16   03/26/18 04:00


 


BP  123/66   03/26/18 09:42


 


Pulse Ox  97   03/26/18 09:42








 Intake & Output











 03/25/18 03/26/18 03/26/18





 18:59 06:59 18:59


 


Intake Total 472 20 180


 


Balance 472 20 180


 


Weight  139.5 kg 


 


Intake:   


 


  IV  20 


 


    .9  20 


 


  Oral 472  180


 


Other:   


 


  # Voids 1  1


 


  # Bowel Movements   1














- Exam





PHYSICAL EXAMINATION: 





HEENT: Head is atraumatic, normocephalic.  Pupils equal, round.  Neck is 

supple.  There is no elevated jugular venous pressure.





HEART EXAMINATION: Heart S1, S2 irregularly irregular .  No murmur or gallop 

heard.





CHEST EXAMINATION: Lungs are clear to auscultation and precussion. No chest 

wall tenderness is noted on palpation or with deep breathing.





ABDOMEN:  Soft, nontender. Bowel sounds are heard. No organomegaly noted.


 


EXTREMITIES: 2+ peripheral pulses with no evidence of peripheral edema and no 

calf tenderness noted.





NEUROLOGIC patient is awake, alert and oriented -3.


 


.


 








- Labs


CBC & Chem 7: 


 03/26/18 05:24





 03/26/18 05:24


Labs: 


 Abnormal Lab Results - Last 24 Hours (Table)











  03/26/18 Range/Units





  05:24 


 


Glucose  106 H  (74-99)  mg/dL














Assessment and Plan


Plan: 


Assessment and plan


#1 acute non-ST elevation myocardial infarction, status post angioplasty and 

stenting of the LAD and circumflex artery.


#2 chronic persistent atrial fibrillation


#3 ischemic cardio myopathy with documented ejection fraction of 30-35%.


#4 hyperlipidemia








Plan


Patient has been encouraged to be up in the hallway as tolerated.  We'll make 

medication adjustments as necessary, plan for possible discharge home in 24-48 

hours if stable.  Further recommendations to follow.








DNP note has been reviewed, I agree with a documented findings and plan of 

care.  Patient was seen and examined.

## 2018-03-26 NOTE — P.PN
Subjective


Progress Note Date: 03/26/18





Patient seen and examined at the bedside. Patient underwent cardiac cath on 3/24

/2018 with stent placement to the LAD and circumflex. patient currently denies 

chest pain.  Denies shortness of breath or coughing.  Denies nausea or 

vomiting.  Patient is tolerating PO intake.  States he is voiding without 

difficulty.








Objective





- Vital Signs


Vital signs: 


 Vital Signs











Temp  97.7 F   03/26/18 04:00


 


Pulse  59 L  03/26/18 11:50


 


Resp  18   03/26/18 11:50


 


BP  126/79   03/26/18 11:50


 


Pulse Ox  98   03/26/18 11:50








 Intake & Output











 03/25/18 03/26/18 03/26/18





 18:59 06:59 18:59


 


Intake Total 472 20 180


 


Balance 472 20 180


 


Weight  139.5 kg 


 


Intake:   


 


  IV  20 


 


    .9  20 


 


  Oral 472  180


 


Other:   


 


  # Voids 1  1


 


  # Bowel Movements   1














- Exam





GENERAL: This is a 63-year-old  male in no apparent distress at the 

time of examination. Pleasant and cooperative.


HEENT: Head is atraumatic, normocephalic. Pupils are equal, round, and reactive 

to light. Sclerae anicteric. Conjunctivae are clear. Mucus membranes of the 

mouth are moist. Neck is supple. 


RESPIRATORY: Clear to ausculation. No wheezes, rales, or rhonchi.  No use of 

accessory muscles.  Patient maintaining oxygen saturation greater than 92%. No 

chest wall tenderness is noted on palpation or with deep breathing.


CARDIOVASCULAR: irregular rate. S1 and S2 noted.  No systolic or diastolic 

murmur auscultated.  No JVD noted. No S3 or S4 noted.


GASTROINTESTINAL: No distention noted.  Abdomen soft and round.  Normal active 

bowel sounds auscultated x 4 quadrants.  No pain or tenderness noted upon 

palpation.


INTEGUMENTARY: No cyanosis. No jaundice. No rashes noted. No cellulitis noted.


EXTREMITIES: 2+ peripheral pulses. No evidence of peripheral edema. No calf 

tenderness noted.


NEUROLOGIC:  Cranial nerves II-XII intact.


PSYCHIATRIC: Awake, alert, and oriented X 3. Appropriate affect. Intact 

judgement and insight.








- Labs


CBC & Chem 7: 


 03/26/18 05:24





 03/26/18 05:24


Labs: 


 Abnormal Lab Results - Last 24 Hours (Table)











  03/26/18 Range/Units





  05:24 


 


Glucose  106 H  (74-99)  mg/dL














Assessment and Plan


Plan: 





ASSESSMENT:





Acute non-ST elevated myocardial infarction, s/p angioplasty and stent of the 

LAD and circumflex artery


Chronic atrial fibrillation


Ischemic cardiomyopathy with ejection fraction of 30-35%


Hyperlipidemia


Morbid obesity: BMI 40.6





PLAN:





Cardiology on consult.


Continue current medications


Home meds as appropriate


Monitor labs


Monitor vital signs and address as appropriate


Discharge planning: Patient to return home when stable


Further recommendations pending patient's course


Anticipate discharge home tomorrow








Nurse practitioner note has been reviewed by physician. Signing provider agrees 

with the documented findings, assessment, and plan of care.

## 2018-03-26 NOTE — CDI
Last Revision, December 2017





            Documentation Clarification Form



Date:  3/26/18 1230

From: Shelbi Palma RN, CCDS

Phone: [phone]

MRN: [id number]

Admit Date: 3/24/2018 6:03:00 AM

Patient Name: Bradley Pedro 

Visit Number: [id number]



ATTENTION: The Clinical Documentation Specialists (CDI) and [facility] Coding Staff 
appreciate your assistance in clarifying documentation. Please respond to the 
clarification below the line at the bottom and electronically sign. The CDI & 
[facility] Coding staff will review the response and follow-up if needed. Please note: 
Queries are made part of the Legal Health Record. If you have any questions, 
please contact the author of this message via ITS.



Dr. Ari Dow/ Jerilyn Quinn DNP



History/Risk Factors:

NSTEMI this admission with PTCA, Ischemic cardiomyopathy, Chronic Atrial Fib



Clinical Indicators:  

VS/Pulse OX:  Temp 98.2, HR 92, RR 20, B/P 153/105, Spo2 96% RA

BNP: not done

Echocardiogram Results: EF 30-35%

Chest X Ray: not done



Treatment:  

Lasix 40 mg PO BID



In your professional opinion, can you please clarify the acuity and type of CHF 
if known?



Chronic Systolic Heart Failure:

Chronic Diastolic Heart Failure:

Chronic Systolic & Diastolic Heart Failure:

Unable to Determine

Other, please specify



Please continue to document in your progress notes and discharge summary in 
order to capture severity of illness and risk of mortality. Include clinical 
findings that support your diagnosis.

___________________________________________________________________
MTDD

## 2018-03-27 VITALS — TEMPERATURE: 98.1 F | SYSTOLIC BLOOD PRESSURE: 113 MMHG | DIASTOLIC BLOOD PRESSURE: 78 MMHG

## 2018-03-27 VITALS — HEART RATE: 91 BPM

## 2018-03-27 RX ADMIN — THERA TABS SCH EACH: TAB at 08:44

## 2018-03-27 RX ADMIN — FUROSEMIDE SCH MG: 40 TABLET ORAL at 08:44

## 2018-03-27 RX ADMIN — CLOPIDOGREL BISULFATE SCH MG: 75 TABLET ORAL at 08:44

## 2018-03-27 RX ADMIN — ASPIRIN 81 MG CHEWABLE TABLET SCH MG: 81 TABLET CHEWABLE at 08:43

## 2018-03-27 RX ADMIN — METOPROLOL TARTRATE SCH MG: 50 TABLET, FILM COATED ORAL at 08:44

## 2018-03-27 RX ADMIN — LOSARTAN POTASSIUM AND HYDROCHLOROTHIAZIDE SCH EACH: 12.5; 5 TABLET ORAL at 08:44

## 2018-03-27 NOTE — P.DS
Providers


Date of admission: 


03/24/18 06:03





Expected date of discharge: 03/27/18


Attending physician: 


Reid Stromberg





Consults: 





 





03/24/18 06:03


Consult Physician Urgent 


   Consulting Provider: Ari Dow


   Consult Reason/Comments: NSTEMI


   Do you want consulting provider notified?: Already Contacted





03/24/18 12:23


Consult Physician Routine 


   Consulting Provider: [facility]


   Consult Reason/Comments: Post Interventional patient


   Do you want consulting provider notified?: Already Contacted











Primary care physician: 


Reid Stromberg





[facility] [place]: 





63-year-old  male who originally presented to [facility] 

with a chief complaint of chest pain and was transferred to [facility] after evaluation.  The patient states he ate 4 White Castles and then 

noticed that he was having pain to the left chest.  He told the ER doctor the 

pain was aching, constant, moderate intensity.  It was accompanied by some 

dyspnea, palpitations, and nausea.  He took 2 aspirin.  While at [facility] 

he was noted to be in atrial fibrillation with rapid ventricular rate, and he 

also had a minimally elevated troponin at 0.4.  Subsequent troponins are 13.200

, 19.100, and 12.900. Patient underwent cardiac cath on 3/24/2018 with stent 

placement to the LAD and circumflex.  The patient has remained stable since 

cardiac catheterization.  He has not had any further episodes of chest pain.  

The patient has been up ambulating.  He remains hemodynamically stable.  He was 

cleared for discharge from a cardiac standpoint is to follow-up on an 

outpatient basis.  Prescriptions were sent to the patient's preferred pharmacy 

for aspirin 81 mg daily, Lipitor 80 mg at night, Plavix 75 mg daily, Hyzaar 50-

12.5 2 tablets daily, metoprolol tartrate 50 mg by mouth twice a day, 

nitroglycerin sublingual tablets when necessary for chest pain, and Xarelto 20 

mg by mouth with supper.  His Cardizem and Coumadin were discontinued per 

cardiology.





DISCHARGE DIAGNOSIS:





Acute non-ST elevated myocardial infarction, s/p angioplasty and stent of the 

LAD and circumflex artery


Chronic atrial fibrillation


Ischemic cardiomyopathy with ejection fraction of 30-35%


Hyperlipidemia


Morbid obesity: BMI 40.6





Nurse practitioner note has been reviewed by physician. Signing provider agrees 

with the documented findings, assessment, and plan of care. 





Patient Condition at Discharge: Stable





Plan - Discharge Summary


Discharge Rx Participant: Yes


New Discharge Prescriptions: 


New


   Aspirin 81 mg PO DAILY  chew


   Atorvastatin [Lipitor] 80 mg PO HS #30 tab


   Clopidogrel [Plavix] 75 mg PO DAILY #30 tab


   Losartan-Hctz 50-12.5 mg [Hyzaar 50-12.5] 2 each PO DAILY #60 tab


   Metoprolol Tartrate [Lopressor] 50 mg PO BID #60 tab


   Nitroglycerin Sl Tabs [Nitrostat] 0.4 mg SUBLINGUAL Q5M PRN #25 tab


     PRN Reason: Chest Pain


   Rivaroxaban [Xarelto] 20 mg PO W/SUPPER #30 tab





Continue


   Furosemide [Lasix] 40 mg PO DAILY


   Multivitamin [Men's Multi-Vitamin] 1 tab PO DAILY


   L.acidoph,Paracasei, B.lactis [Probiotic] 1 cap PO DAILY


   Omega-3 Fatty Acids/Fish Oil [Fish Oil 1,000 mg Softgel] 4,000 mg PO DAILY





Discontinued


   Warfarin [Coumadin] 5 mg PO SUTUFR


   Diltiazem HCl [Diltiazem ER] 360 mg PO DAILY


   Warfarin [Coumadin] 2.5 mg PO MOWETHSA


Discharge Medication List





Furosemide [Lasix] 40 mg PO DAILY 05/28/14 [History]


Multivitamin [Men's Multi-Vitamin] 1 tab PO DAILY 05/28/14 [History]


L.acidoph,Paracasei, B.lactis [Probiotic] 1 cap PO DAILY 03/24/18 [History]


Omega-3 Fatty Acids/Fish Oil [Fish Oil 1,000 mg Softgel] 4,000 mg PO DAILY 03/24 /18 [History]


Aspirin 81 mg PO DAILY  chew 03/27/18 [Rx]


Atorvastatin [Lipitor] 80 mg PO HS #30 tab 03/27/18 [Rx]


Clopidogrel [Plavix] 75 mg PO DAILY #30 tab 03/27/18 [Rx]


Losartan-Hctz 50-12.5 mg [Hyzaar 50-12.5] 2 each PO DAILY #60 tab 03/27/18 [Rx]


Metoprolol Tartrate [Lopressor] 50 mg PO BID #60 tab 03/27/18 [Rx]


Nitroglycerin Sl Tabs [Nitrostat] 0.4 mg SUBLINGUAL Q5M PRN #25 tab 03/27/18 [Rx

]


Rivaroxaban [Xarelto] 20 mg PO W/SUPPER #30 tab 03/27/18 [Rx]








Follow up Appointment(s)/Referral(s): 


Christian Jeronimo MD [STAFF PHYSICIAN] - 04/03/18 3:00 pm (Tuesday)


Stromberg,Reid, MD [Primary Care Provider] - 04/02/18 4:15 pm (Monday)


Patient Instructions/Handouts:  *Surgery [facility] - After Heart Catheterization - 

Ambulatory Care Instructions, Heart Healthy Diet (DC), Heart Catheterization (DC

)


Activity/Diet/Wound Care/Special Instructions: 


First month of Xarelto is free and can be picked up from [facility]





Do not return to work until you are seen by your primary care physician at your 

follow up appointment


Discharge Disposition: HOME SELF-CARE

## 2018-03-27 NOTE — PN
PROGRESS NOTE



Bradley is admitted to hospital with non ST-segment elevation MI.  Underwent cardiac

catheterization and angioplasty of LAD and circumflex coronary artery.  The patient has

ischemic cardiomyopathy with an ejection fraction of 30% to 35% and has chronic atrial

fibrillation with controlled ventricular rate.



PHYSICAL EXAM:

Today he is comfortable at rest.  Vital signs are stable.  There is no jugular venous

distention.  Chest exam reveals good air entry bilaterally.  Heart exam reveals first

and second heart sounds.  No gallop.  No murmur.  Abdomen is soft, nontender.  Exam of

extremities did not reveal any edema.  Peripheral pulses are felt.  He has irregular

rhythm.



CURRENT MEDICATIONS:

Include aspirin, Lipitor 80 mg daily, Plavix 75 mg daily, Lasix, Hyzaar, Lopressor 50

b.i.d., and Xarelto.



ASSESSMENT:

Non ST-segment elevation myocardial infarction, ischemic cardiomyopathy, chronic atrial

fibrillation.



PLAN:

Patient is doing well.  I talked to him about his medications, activities, limitations.

He is stable to be discharged home and have outpatient follow up with Dr. Jeronimo.





VENU / LENY: [id number] / Job#: [id number]

## 2018-03-27 NOTE — P.PN
Subjective


Progress Note Date: 03/27/18


Principal diagnosis: 





NSTEMI


This is a pleasant 63-year-old  gentleman with history of chronic 

persistent atrial fibrillation, presented to the hospital with a non-ST 

elevation myocardial infarction.  He was taken for cardiac catheterization and 

subsequently underwent stenting of LAD and circumflex artery.  Echocardiogram 

with Doppler study was performed which revealed an ejection fraction of 30-35%.

  Upon examination, patient is resting comfortable in bed.  Right radial 

puncture site is soft without ecchymosis or hematoma.  It'll signs are stable 

with a heart rate in the 70s and blood pressure 113/78 and oxygen saturation of 

97% on room air.  The retrograde values were reviewed and showed hemoglobin of 

13.8, potassium 4.4 and creatinine of 0.89.








Objective





- Vital Signs


Vital signs: 


 Vital Signs











Temp  98.1 F   03/27/18 08:51


 


Pulse  78   03/27/18 08:51


 


Resp  18   03/27/18 08:51


 


BP  113/78   03/27/18 08:51


 


Pulse Ox  97   03/27/18 08:51








 Intake & Output











 03/26/18 03/27/18 03/27/18





 18:59 06:59 18:59


 


Intake Total 1020  180


 


Output Total  0 


 


Balance 1020 0 180


 


Weight 139.5 kg 138.7 kg 


 


Intake:   


 


  Oral 1020  180


 


Output:   


 


  Stool  0 


 


Other:   


 


  Voiding Method  Toilet 


 


  # Voids 2 2 


 


  # Bowel Movements 1  














- Exam


PHYSICAL EXAMINATION: 





HEENT: Head is atraumatic, normocephalic.  Pupils equal, round.  Neck is 

supple.  There is no elevated jugular venous pressure.





HEART EXAMINATION: Heart sounds irregularly irregular, S1 and S2 normal.  No 

murmur or gallop heard.





CHEST EXAMINATION: Lungs are clear to auscultation and precussion. No chest 

wall tenderness is noted on palpation or with deep breathing.





ABDOMEN:  Soft, nontender. Bowel sounds are heard. No organomegaly noted.


 


EXTREMITIES: 2+ peripheral pulses with no evidence of peripheral edema and no 

calf tenderness noted.  Right radial puncture site soft without ecchymosis or 

hematoma.





NEUROLOGIC patient is awake, alert and oriented x3.


 


.


 











- Labs


CBC & Chem 7: 


 03/26/18 05:24





 03/26/18 05:24





Assessment and Plan


Assessment: 


#1 acute non-ST elevation myocardial infarction, status post angioplasty and 

stenting of the LAD and circumflex artery


#2 chronic atrial fibrillation


#3 ischemic cardiomyopathy with documented ejection fraction of 30-35%


#4 hyperlipidemia


#5 hypertension








Plan: 


From cardiology's perspective, patient is stable for discharge home.  Continue 

Plavix and aspirin as well as Xarelto.  Also continue Hyzaar, metoprolol and 

Lipitor.  He will follow up with Dr. Jeronimo in the office next week.





The above dictated assessment and findings were discussed with signing 

physician. The impression and plan of care have been directed as dictated. 

Talita Robertson, Nurse Practitioner, acting as scribe for signing physician.

## 2018-04-04 NOTE — P.PN
Progress Note - Text


Progress Note Date: 04/04/18





This is an addendum to the cardiology progress note.  Patient has congestive 

cardiac failure, systolic, acute on chronic.





DNP note has been reviewed, I agree with a documented findings and plan of 

care.  Patient was seen and examined.

## 2019-07-22 ENCOUNTER — HOSPITAL ENCOUNTER (OUTPATIENT)
Dept: HOSPITAL 47 - RADXRMAIN | Age: 64
Discharge: HOME | End: 2019-07-22
Attending: NURSE PRACTITIONER
Payer: COMMERCIAL

## 2019-07-22 DIAGNOSIS — J20.9: Primary | ICD-10-CM

## 2019-07-22 PROCEDURE — 71046 X-RAY EXAM CHEST 2 VIEWS: CPT

## 2019-07-22 NOTE — XR
EXAMINATION TYPE: XR chest 2V

 

DATE OF EXAM: 7/22/2019

 

COMPARISON: Prior chest x-ray 3/23/2018

 

HISTORY: Acute bronchitis

 

TECHNIQUE:  Frontal and lateral views of the chest are obtained.

 

FINDINGS:  The heart remains enlarged. There is no evident airspace disease, pneumothorax, or pleural
 effusion. Bones are stable. There is bronchial wall thickening.

 

IMPRESSION: Correlate for bronchitis and follow-up as indicated. Stable cardiomegaly.

 

IMPRESSION:  No acute cardiopulmonary process.

## 2019-08-29 ENCOUNTER — HOSPITAL ENCOUNTER (OUTPATIENT)
Dept: HOSPITAL 47 - RADXRMAIN | Age: 64
Discharge: HOME | End: 2019-08-29
Attending: NURSE PRACTITIONER
Payer: COMMERCIAL

## 2019-08-29 DIAGNOSIS — I51.7: Primary | ICD-10-CM

## 2019-08-29 PROCEDURE — 71046 X-RAY EXAM CHEST 2 VIEWS: CPT

## 2019-08-29 NOTE — XR
EXAMINATION TYPE: XR chest 2V

 

DATE OF EXAM: 8/29/2019

 

COMPARISON: 7/22/2019

 

TECHNIQUE: PA and lateral views submitted.

 

HISTORY: Cough

 

FINDINGS:

Heart is enlarged and there is biapical pleural thickening. No overt failure. No pleural effusion. Hy
pertrophic change of the spine. No consolidative pneumonia.

 

IMPRESSION:

1. Cardiomegaly

## 2020-09-04 ENCOUNTER — HOSPITAL ENCOUNTER (OUTPATIENT)
Dept: HOSPITAL 47 - LABPAT | Age: 65
Discharge: HOME | End: 2020-09-04
Attending: ORTHOPAEDIC SURGERY
Payer: COMMERCIAL

## 2020-09-04 DIAGNOSIS — I48.91: ICD-10-CM

## 2020-09-04 DIAGNOSIS — Z01.812: ICD-10-CM

## 2020-09-04 DIAGNOSIS — Z01.810: Primary | ICD-10-CM

## 2020-09-04 LAB
ANION GAP SERPL CALC-SCNC: 9 MMOL/L
BASOPHILS # BLD AUTO: 0.1 K/UL (ref 0–0.2)
BASOPHILS NFR BLD AUTO: 1 %
CHLORIDE SERPL-SCNC: 103 MMOL/L (ref 98–107)
CO2 SERPL-SCNC: 24 MMOL/L (ref 22–30)
EOSINOPHIL # BLD AUTO: 0.2 K/UL (ref 0–0.7)
EOSINOPHIL NFR BLD AUTO: 2 %
ERYTHROCYTE [DISTWIDTH] IN BLOOD BY AUTOMATED COUNT: 5.06 M/UL (ref 4.3–5.9)
ERYTHROCYTE [DISTWIDTH] IN BLOOD: 14.1 % (ref 11.5–15.5)
HCT VFR BLD AUTO: 43.6 % (ref 39–53)
HGB BLD-MCNC: 14.2 GM/DL (ref 13–17.5)
INR PPP: 1.1 (ref ?–1.2)
LYMPHOCYTES # SPEC AUTO: 1.7 K/UL (ref 1–4.8)
LYMPHOCYTES NFR SPEC AUTO: 19 %
MCH RBC QN AUTO: 28 PG (ref 25–35)
MCHC RBC AUTO-ENTMCNC: 32.5 G/DL (ref 31–37)
MCV RBC AUTO: 86.2 FL (ref 80–100)
MONOCYTES # BLD AUTO: 0.6 K/UL (ref 0–1)
MONOCYTES NFR BLD AUTO: 7 %
NEUTROPHILS # BLD AUTO: 5.8 K/UL (ref 1.3–7.7)
NEUTROPHILS NFR BLD AUTO: 66 %
PLATELET # BLD AUTO: 276 K/UL (ref 150–450)
POTASSIUM SERPL-SCNC: 4.5 MMOL/L (ref 3.5–5.1)
PT BLD: 11.2 SEC (ref 9–12)
SODIUM SERPL-SCNC: 136 MMOL/L (ref 137–145)
WBC # BLD AUTO: 8.9 K/UL (ref 3.8–10.6)

## 2020-09-04 PROCEDURE — 80051 ELECTROLYTE PANEL: CPT

## 2020-09-04 PROCEDURE — 87070 CULTURE OTHR SPECIMN AEROBIC: CPT

## 2020-09-04 PROCEDURE — 85610 PROTHROMBIN TIME: CPT

## 2020-09-04 PROCEDURE — 85025 COMPLETE CBC W/AUTO DIFF WBC: CPT

## 2020-09-13 NOTE — HP
HISTORY AND PHYSICAL



REASON FOR ADMISSION:

Surgery is scheduled for 09/14/2020



HISTORY OF PRESENT ILLNESS:

Bradley Pedro is a 64-year-old patient seen with progressive left knee pain consistent

with symptomatic advanced osteoarthritis.  We discussed options.  He elected to proceed

with total knee arthroplasty.  Consent regarding the procedure was obtained.  Medical

clearance was provided as well as cardiac clearance by Dr. Jeronimo.



PAST MEDICAL HISTORY:

Hypertension, hyperlipidemia.



PAST SURGICAL HISTORY:

Left knee arthroscopy.



MEDICATIONS:

Lipitor, losartan, Xarelto, Lasix, Lopressor.



ALLERGIES:

None.



SOCIAL HISTORY:

He denies current tobacco use.



PHYSICAL EXAMINATION:

Evaluation of the left knee, his range of motion is -2 to 125.  Tenderness medial joint

line.  Crepitus along the medial and patellofemoral compartments range of motion.  Pain

with patellofemoral compression.  Ligaments stable.  Hip rotation without pain.  Distal

neurovascular exam intact.



RADIOGRAPHS:

Radiographs of the left knee reveal severe osteoarthritic changes.



IMPRESSION:

1. Left knee osteoarthritis.

2. Hypertension.

3. Hyperlipidemia.



PLAN:

Left total knee arthroplasty. Surgery scheduled for 09/14/2020.





MMODL / IJN: [id number] / Job#: [id number]

## 2020-09-14 ENCOUNTER — HOSPITAL ENCOUNTER (OUTPATIENT)
Dept: HOSPITAL 47 - OR | Age: 65
Discharge: HOME HEALTH | End: 2020-09-14
Attending: ORTHOPAEDIC SURGERY
Payer: COMMERCIAL

## 2020-09-14 VITALS — BODY MASS INDEX: 40.6 KG/M2

## 2020-09-14 VITALS — RESPIRATION RATE: 20 BRPM

## 2020-09-14 VITALS — DIASTOLIC BLOOD PRESSURE: 70 MMHG | SYSTOLIC BLOOD PRESSURE: 112 MMHG | HEART RATE: 80 BPM

## 2020-09-14 VITALS — TEMPERATURE: 96.8 F

## 2020-09-14 DIAGNOSIS — I10: ICD-10-CM

## 2020-09-14 DIAGNOSIS — Z79.899: ICD-10-CM

## 2020-09-14 DIAGNOSIS — M17.12: Primary | ICD-10-CM

## 2020-09-14 DIAGNOSIS — Z82.49: ICD-10-CM

## 2020-09-14 DIAGNOSIS — E78.5: ICD-10-CM

## 2020-09-14 DIAGNOSIS — Z95.5: ICD-10-CM

## 2020-09-14 DIAGNOSIS — I48.19: ICD-10-CM

## 2020-09-14 DIAGNOSIS — G47.33: ICD-10-CM

## 2020-09-14 DIAGNOSIS — Z79.82: ICD-10-CM

## 2020-09-14 DIAGNOSIS — E66.9: ICD-10-CM

## 2020-09-14 DIAGNOSIS — Z79.01: ICD-10-CM

## 2020-09-14 DIAGNOSIS — I25.10: ICD-10-CM

## 2020-09-14 PROCEDURE — 97161 PT EVAL LOW COMPLEX 20 MIN: CPT

## 2020-09-14 PROCEDURE — 73560 X-RAY EXAM OF KNEE 1 OR 2: CPT

## 2020-09-14 PROCEDURE — 64448 NJX AA&/STRD FEM NRV NFS IMG: CPT

## 2020-09-14 PROCEDURE — 97110 THERAPEUTIC EXERCISES: CPT

## 2020-09-14 PROCEDURE — 27447 TOTAL KNEE ARTHROPLASTY: CPT

## 2020-09-14 PROCEDURE — 76942 ECHO GUIDE FOR BIOPSY: CPT

## 2020-09-14 PROCEDURE — 88300 SURGICAL PATH GROSS: CPT

## 2020-09-14 NOTE — XR
EXAMINATION TYPE: XR knee limited LT

 

DATE OF EXAM: 9/14/2020

 

CLINICAL HISTORY: Left knee pain and arthritis status post total knee replacement.

 

TECHNIQUE:  Portable AP and crosstable lateral views of the left knee are obtained immediately postop
eratively.

 

COMPARISON: None

 

FINDINGS:  Metallic hardware from total left knee arthroplasty is seen and appears satisfactory in al
ignment and position.  There is evidence of recent surgery with diffuse subcutaneous and cutaneous ir
regularity. No unexpected radiopaque foreign body. 

 

 

IMPRESSION:  METALLIC HARDWARE FROM TOTAL LEFT KNEE ARTHROPLASTY IS SATISFACTORY IN ALIGNMENT.

## 2020-09-14 NOTE — P.OP
Date of Procedure: 09/14/20


Preoperative Diagnosis: 


Left knee osteoarthritis


Postoperative Diagnosis: 


Left knee osteoarthritis


Procedure(s) Performed: 


Left total knee arthroplasty


Implants: 


1.  Depuy attune size 7 left cruciate retaining cemented femur


2.  Depuy attune size 7 fixed bearing cemented tibial baseplate


3.  Depuy attune size 7 fixed bearing cruciate retaining 8 mm polyethylene 

tibial insert


4.  Depuy attune 41 mm all polyethylene cemented patella


Anesthesia: GETA, regional (Adductor canal catheter), local


Surgeon: Prabhjot Lino


Assistant #1: Cezar Salinas


Estimated Blood Loss (ml): 40


Pathology: other (Bone)


Condition: stable


Disposition: PACU


Indications for Procedure: 


65-year-old patient seen with progressive symptomatic left knee osteoarthritis. 

After treatment options were discussed, he elected to proceed with total knee 

arthroplasty


Operative Findings: 


see description of procedure


Description of Procedure: 


Patient was taken to the operative suite after having an adductor canal catheter

placed by the department of anesthesia.  Patient underwent a general anesthetic 

by the department of anesthesia.  Patient was given preoperative IV intake 

antibiotics and TXA.  A well-padded tourniquet was placed about the left lower 

extremity.  The lower extremity was then prepped and draped in the normal 

sterile orthopedic fashion.  The extremity was elevated, a tourniquet was 

insufflated to 300.  A standard anterior incision was made sharply through skin.

 Dissection was taken down through the subcutaneous soft tissues down to the 

extensor mechanism.  A medial arthrotomy was performed, patella was everted and 

knee was flexed.  There was advanced osteoarthritis noted.  I introduced my 

distal intramedullary femoral drill.  I then introduced the distal femoral 

cutting jig.  Chucho BHANDARI secured the cutting jig with 2 pins.  I held 

retractors in position while Chucho BHANDARI performed the distal femoral 

resection through the guide area we now removed her distal femoral cutting 

guide.  We now placed our 4-in-1 femoral cutting block and positioned and it was

secured with 2 pins by Chucho BHANDARI while I held the block in position.  The 

distal femoral finishing was now completed.  A proximal tibial cutting guide was

positioned.  I held the guide in the appropriate position with both hands well 

Chucho BHANDARI inserted stabilizing pins into the guide.  Proximal tibial cut 

was made. We now placed a trial femoral component into position, along with an 

appropriate size tibial tray and insert.  We now took the knee through range of 

motion and had full extension good flexion and good overall soft tissue balance 

noted.  The patella was everted and stabilized with 2 towel clips held by Chucho BHANDARI while I performed a flush with patellar quad tendon utilizing a fresh 

sawblade.  We templated the patella, appropriate drill holes were made.  An 

appropriate trial patella was positioned, knee was taken through full range of 

motion with the patella tracking very nicely.  The trial patella was removed.  

Drill holes were made through the femoral component.  All trial components were 

removed after marking off the appropriate rotation of the tibia.  Retractors 

were now positioned along the proximal tibia.  An appropriate keel punch was 

made with the appropriate size tibial guide by myself on Chucho BHANDARI assisted

by holding retractors.  At this point appropriate size implants were chosen and 

opened.  The joint was irrigated copiously with pulse lavage mechanical 

irrigation.  The posterior capsule was infiltrated with local analgesic.  The 

wound was irrigated with pulse lavage mechanical irrigation.  We mixed 

antibiotic methylmethacrylate.  We placed the knee into flexion.  We placed 

multiple retractors assisted by Chucho BHANDARI to expose the proximal tibia.  

Once the methyl methacrylate was ready, the tibial component was cemented into 

place removing any excess methylmethacrylate form by both myself and Chucho BHANDARI.  The femoral component was cemented into place removing the removing any 

excess methylmethacrylate performed by both myself and Chucho BHANDARI.  We then 

inserted the appropriate size polyethylene tibial insert.  We made sure that it 

was locked into position.  We took the knee into full extension, and then back 

in a flexion making sure we had removed any excess methylmethacrylate.  The 

patellar component was then cemented down and secured with clamp.  Excess 

methylmethacrylate removed.  We kept the knee in full extension, patellar clamp 

in position until methylmethacrylate had hardened.  Once it had hardened the pa

tellar clamp was removed.  The knee was taken through full range of motion.  The

patella tracked nicely.  There was good soft tissue balancing.  The tourniquet 

was now released.  Additional hemostasis was achieved via electrocautery.  A 

second gram of TXA was given.  The wound again was irrigated with pulse lavage 

mechanical irrigation.  The superficial soft tissues were infiltrated local 

analgesic.  The extensor mechanism was repaired with Vicryl.  We checked the 

repair with range of motion and it was stable.  The subcutaneous soft tissues 

were repaired with Vicryl in layers.  The skin was approximated with 

pernio/Dermabond.  Sterile dressings were applied followed by loose web roll and

Ace bandage.  The patient was transferred to a bed, and taken to recovery in 

stable and satisfactory condition.  Chucho BHANDARI assisted with this complex 

procedure.

## 2020-09-14 NOTE — P.ANPRN
Procedure Note - Anesthesia





- Nerve Block Performed


  ** Left Adductor Canal Infusion


Time Out Performed: Yes


Date of Procedure: 09/14/20


Procedure Start Time: 06:49


Procedure Stop Time: 07:00


Location of Patient: PreOp


Indication: Acute Post-Operative Pain, Requested by Surgeon


Sedation Type: Sedate with meaningful contact maintained


Preparation: Sterile Prep, Sterile Dressing


Position: Supine


Catheter: Indwelling


Needle Types: Pajunk


Needle Gauge: 21


Ultrasound used to visualize needle placement: Yes


Ultrasound used to observe medication spread: Yes


Blood Aspirated: No


Pain Paresthesia on Injection Noted: No


Resistance on Injection: Normal


Image Stored and Saved: Yes


Events: Uneventful and Well Tolerated (ropi .5% 20cc plus dexamethasone 4mg)

## 2021-11-02 ENCOUNTER — HOSPITAL ENCOUNTER (OUTPATIENT)
Dept: HOSPITAL 47 - RADUSWWP | Age: 66
Discharge: HOME | End: 2021-11-02
Attending: FAMILY MEDICINE
Payer: COMMERCIAL

## 2021-11-02 DIAGNOSIS — I73.9: Primary | ICD-10-CM

## 2021-11-02 PROCEDURE — 93922 UPR/L XTREMITY ART 2 LEVELS: CPT

## 2021-11-03 NOTE — P.ARTDOP
Arterial Doppler





LOWER EXTREMITY ARTERIAL DOPPLER:





DATE OF SERVICE: 11/02/2021





Reason for study: Leg discoloration.





Doppler waveforms: Multiphasic bilaterally throughout with good toe waveforms.





Pulse volume recording: [].





Pressure gradients: None.





Ankle-brachial indices: Greater than 1 bilaterally.





Toe brachial indices: 0.7 to on the right, 0.66 on the left





Impression: Normal study.

## 2022-07-25 ENCOUNTER — HOSPITAL ENCOUNTER (OUTPATIENT)
Dept: HOSPITAL 47 - LABPAT | Age: 67
Discharge: HOME | End: 2022-07-25
Attending: ORTHOPAEDIC SURGERY
Payer: COMMERCIAL

## 2022-07-25 DIAGNOSIS — Z22.322: ICD-10-CM

## 2022-07-25 DIAGNOSIS — Z79.01: ICD-10-CM

## 2022-07-25 DIAGNOSIS — I48.0: ICD-10-CM

## 2022-07-25 DIAGNOSIS — M17.11: ICD-10-CM

## 2022-07-25 DIAGNOSIS — Z01.818: Primary | ICD-10-CM

## 2022-07-25 LAB
ALBUMIN SERPL-MCNC: 4.2 G/DL (ref 3.8–4.9)
ALBUMIN/GLOB SERPL: 0.86 G/DL (ref 1.6–3.17)
ALP SERPL-CCNC: 132 U/L (ref 41–126)
ALT SERPL-CCNC: 61 U/L (ref 10–49)
ANION GAP SERPL CALC-SCNC: 11.4 MMOL/L (ref 10–18)
AST SERPL-CCNC: 96 U/L (ref 14–35)
BASOPHILS # BLD AUTO: 0.06 X 10*3/UL (ref 0–0.1)
BASOPHILS NFR BLD AUTO: 0.9 %
BUN SERPL-SCNC: 9.5 MG/DL (ref 9–27)
BUN/CREAT SERPL: 8.64 RATIO (ref 12–20)
CALCIUM SPEC-MCNC: 10 MG/DL (ref 8.7–10.3)
CHLORIDE SERPL-SCNC: 101 MMOL/L (ref 96–109)
CO2 SERPL-SCNC: 24.6 MMOL/L (ref 20–27.5)
EOSINOPHIL # BLD AUTO: 0.17 X 10*3/UL (ref 0.04–0.35)
EOSINOPHIL NFR BLD AUTO: 2.6 %
ERYTHROCYTE [DISTWIDTH] IN BLOOD BY AUTOMATED COUNT: 5.31 X 10*6/UL (ref 4.4–5.6)
ERYTHROCYTE [DISTWIDTH] IN BLOOD: 13.5 % (ref 11.5–14.5)
GLOBULIN SER CALC-MCNC: 4.9 G/DL (ref 1.6–3.3)
GLUCOSE SERPL-MCNC: 125 MG/DL (ref 70–110)
HCT VFR BLD AUTO: 47.2 % (ref 39.6–50)
HGB BLD-MCNC: 14.9 G/DL (ref 13–17)
IMM GRANULOCYTES BLD QL AUTO: 0.5 %
INR PPP: 1.35 (ref 0.9–1.11)
LYMPHOCYTES # SPEC AUTO: 1.5 X 10*3/UL (ref 0.9–5)
LYMPHOCYTES NFR SPEC AUTO: 22.7 %
MCH RBC QN AUTO: 28.1 PG (ref 27–32)
MCHC RBC AUTO-ENTMCNC: 31.6 G/DL (ref 32–37)
MCV RBC AUTO: 88.9 FL (ref 80–97)
MONOCYTES # BLD AUTO: 0.75 X 10*3/UL (ref 0.2–1)
MONOCYTES NFR BLD AUTO: 11.3 %
NEUTROPHILS # BLD AUTO: 4.11 X 10*3/UL (ref 1.8–7.7)
NEUTROPHILS NFR BLD AUTO: 62 %
NRBC BLD AUTO-RTO: 0 /100 WBCS (ref 0–0)
PLATELET # BLD AUTO: 286 X 10*3/UL (ref 140–440)
POTASSIUM SERPL-SCNC: 4.8 MMOL/L (ref 3.5–5.5)
PROT SERPL-MCNC: 9.1 G/DL (ref 6.2–8.2)
PT BLD: 14.6 SEC (ref 9.9–11.9)
SODIUM SERPL-SCNC: 137 MMOL/L (ref 135–145)
WBC # BLD AUTO: 6.62 X 10*3/UL (ref 4.5–10)

## 2022-07-25 PROCEDURE — 87070 CULTURE OTHR SPECIMN AEROBIC: CPT

## 2022-07-25 PROCEDURE — 80053 COMPREHEN METABOLIC PANEL: CPT

## 2022-07-25 PROCEDURE — 85025 COMPLETE CBC W/AUTO DIFF WBC: CPT

## 2022-07-25 PROCEDURE — 85610 PROTHROMBIN TIME: CPT

## 2022-07-31 NOTE — HP
HISTORY AND PHYSICAL



DATE OF SURGERY:

08/01/2022



Bradley Pedro is a 67-year-old patient seen with symptomatic right knee osteoarthritis.

We discussed options for treatment.  He elected to proceed with right total knee

arthroplasty.  Consent was obtained. Medical and cardiac clearances were obtained.



PAST MEDICAL HISTORY:

Atrial fibrillation, hypertension.



PAST SURGICAL HISTORY:

Left total knee arthroplasty, cardiac catheterization with stent insertion.



DAILY MEDICATIONS:

Lipitor, losartan, Xarelto, Lasix.



ALLERGIES:

NONE.



SOCIAL HISTORY:

He denies tobacco use.



PHYSICAL EVALUATION OF THE RIGHT KNEE:

Range of motion is negative 1/2 to 125.  Mild effusion.  Tenderness, medial joint line.

Crepitus, medial and patellofemoral compartments with range of motion.  Pain with

patellofemoral compression.  Ligaments stable.  Hip rotation without pain.  Distal

neurovascular exam is intact.



Right knee radiographs reveal severe osteoarthritic changes of the right knee.



IMPRESSION:

1. Right knee osteoarthritis.

2. Hypertension.

3. Hyperlipidemia.

4. Atrial fibrillation.



PLAN:

Right total knee arthroplasty.





MMODL / IJN: [id number] / Job#: [id number]

## 2022-08-01 ENCOUNTER — HOSPITAL ENCOUNTER (OUTPATIENT)
Dept: HOSPITAL 47 - OR | Age: 67
Discharge: HOME HEALTH | End: 2022-08-01
Attending: ORTHOPAEDIC SURGERY
Payer: COMMERCIAL

## 2022-08-01 VITALS — TEMPERATURE: 96.7 F | RESPIRATION RATE: 16 BRPM

## 2022-08-01 VITALS — DIASTOLIC BLOOD PRESSURE: 59 MMHG | SYSTOLIC BLOOD PRESSURE: 102 MMHG | HEART RATE: 105 BPM

## 2022-08-01 VITALS — BODY MASS INDEX: 38.2 KG/M2

## 2022-08-01 DIAGNOSIS — I48.91: ICD-10-CM

## 2022-08-01 DIAGNOSIS — J44.9: ICD-10-CM

## 2022-08-01 DIAGNOSIS — G47.33: ICD-10-CM

## 2022-08-01 DIAGNOSIS — I25.2: ICD-10-CM

## 2022-08-01 DIAGNOSIS — Z79.01: ICD-10-CM

## 2022-08-01 DIAGNOSIS — Z95.5: ICD-10-CM

## 2022-08-01 DIAGNOSIS — M17.11: Primary | ICD-10-CM

## 2022-08-01 DIAGNOSIS — Z96.652: ICD-10-CM

## 2022-08-01 DIAGNOSIS — E78.5: ICD-10-CM

## 2022-08-01 DIAGNOSIS — Z79.899: ICD-10-CM

## 2022-08-01 DIAGNOSIS — I10: ICD-10-CM

## 2022-08-01 PROCEDURE — 88300 SURGICAL PATH GROSS: CPT

## 2022-08-01 PROCEDURE — 64999 UNLISTED PX NERVOUS SYSTEM: CPT

## 2022-08-01 PROCEDURE — 97161 PT EVAL LOW COMPLEX 20 MIN: CPT

## 2022-08-01 PROCEDURE — 73560 X-RAY EXAM OF KNEE 1 OR 2: CPT

## 2022-08-01 PROCEDURE — 27447 TOTAL KNEE ARTHROPLASTY: CPT

## 2022-08-01 PROCEDURE — 97110 THERAPEUTIC EXERCISES: CPT

## 2022-08-01 PROCEDURE — 76942 ECHO GUIDE FOR BIOPSY: CPT

## 2022-08-01 PROCEDURE — 64448 NJX AA&/STRD FEM NRV NFS IMG: CPT

## 2022-08-01 RX ADMIN — HYDROCODONE BITARTRATE AND ACETAMINOPHEN PRN EACH: 7.5; 325 TABLET ORAL at 14:18

## 2022-08-01 RX ADMIN — HYDROCODONE BITARTRATE AND ACETAMINOPHEN PRN EACH: 7.5; 325 TABLET ORAL at 15:40

## 2022-08-01 NOTE — P.ANPRN
Procedure Note - Anesthesia





- Nerve Block Performed


  ** Right Adductor Canal Infusion


Time Out Performed: Yes (0943)


Date of Procedure: 08/01/22


Procedure Start Time: 09:44


Procedure Stop Time: 09:49


Location of Patient: PreOp


Indication: Acute Post-Operative Pain, Requested by Surgeon


Specifically requested for management of pain by DrGenesis: Prabhjot Lino


Sedation Type: Sedate with meaningful contact maintained


Preparation: Sterile Prep, Sterile Dressing


Position: Supine


Catheter Depth at Skin (cm): 9


Catheter: Indwelling


Needle Types: Pajunk


Needle Gauge: 18


Ultrasound used to visualize needle placement: Yes


Ultrasound used to observe medication spread: Yes


Injectate: 0.5% Ropivacaine (see comment for volume) (15cc + 10cc nacl pf)


Blood Aspirated: No


Pain Paresthesia on Injection Noted: No


Resistance on Injection: Normal


Image Stored and Saved: Yes


Events: Uneventful and Well Tolerated

## 2022-08-01 NOTE — XR
EXAMINATION TYPE: XR knee limited RT

 

DATE OF EXAM: 8/1/2022

 

COMPARISON: Outside radiograph 5/31/2022

 

HISTORY: 67-year-old male evaluation for postoperative abnormality in alignment

 

TECHNIQUE: 2 views

 

FINDINGS:

Images show placement of right total knee arthroplasty. Both distal femoral and proximal tibial compo
nents of the prosthesis are well seated without periprosthetic fracture. Alignment grossly anatomic. 
Anterior soft tissue swelling with scattered soft tissue air as well as intra-articular air related t
o recent operation. Vascular calcifications noted.

 

 

IMPRESSION:

Uncomplicated postoperative appearance right total knee arthroplasty.

## 2022-08-01 NOTE — P.OP
Date of Procedure: 08/01/22


Preoperative Diagnosis: 


Right knee osteoarthritis


Postoperative Diagnosis: 


Right knee osteoarthritis


Procedure(s) Performed: 


Right total knee arthroplasty


Implants: 


1.  Depuy attune size 7 right cruciate-retaining cemented femur


2.  Depuy attune size 7 fixed bearing cemented tibial baseplate


3.  Depuy attune size 7 fixed bearing cruciate retaining 10 mm polyethylene 

tibial insert


4.  Depuy attune 41 mm all polyethylene cemented patella





Anesthesia: GETA, regional (Adductor canal catheter, Ipack block)


Surgeon: Prabhjot Lino


Assistant #1: Cezar Salinas


Estimated Blood Loss (ml): 40


Pathology: other (Bone)


Condition: stable


Disposition: PACU


Indications for Procedure: 


67-year-old patient seen with symptomatic right knee osteoarthritis.  After 

treatment options were discussed, he elected to proceed with total knee 

arthroplasty.


Operative Findings: 


See description of procedure


Description of Procedure: 


Patient was taken to the operative suite after having an adductor canal catheter

placed by the department of anesthesia after having an Ipack block performed by 

the department of anesthesia for postoperative pain management .  Patient 

underwent a general anesthetic by the department of anesthesia.  Patient was 

given preoperative IV intake antibiotics and TXA.  A well-padded tourniquet was 

placed about the right  lower extremity.  The lower extremity was then prepped 

and draped in the normal sterile orthopedic fashion.  The extremity was 

elevated, a tourniquet was insufflated to 300.  A standard anterior incision was

made sharply through skin.  Dissection was taken down through the subcutaneous 

soft tissues down to the extensor mechanism.  A medial arthrotomy was performed,

patella was everted and knee was flexed.  There was advanced osteoarthritis 

noted.  I introduced my distal intramedullary femoral drill.  I then introduced 

the distal femoral cutting jig.  Chucho BHANDARI secured the cutting jig with 2 

pins.  I held retractors in position while Chucho BHANDARI performed the distal 

femoral resection through the guide area we now removed her distal femoral 

cutting guide.  We now placed our 4-in-1 femoral cutting block and positioned 

and it was secured with 2 pins by Chucho BHANDARI while I held the block in 

position.  The distal femoral finishing was now completed.  A proximal tibial 

cutting guide was positioned.  I held the guide in the appropriate position with

both hands well Chucho BHANDARI inserted stabilizing pins into the guide.  

Proximal tibial cut was made. We now placed a trial femoral component into 

position, along with an appropriate size tibial tray and insert.  We now took 

the knee through range of motion and had full extension good flexion and good 

overall soft tissue balance noted.  The patella was everted and stabilized with 

2 towel clips held by Chucho BHANDARI while I performed a flush with patellar 

quad tendon utilizing a fresh sawblade.  We templated the patella, appropriate 

drill holes were made.  An appropriate trial patella was positioned, knee was 

taken through full range of motion with the patella tracking very nicely.  The 

trial patella was removed.  Drill holes were made through the femoral component.

 All trial components were removed after marking off the appropriate rotation of

the tibia.  Retractors were now positioned along the proximal tibia.  An 

appropriate keel punch was made with the appropriate size tibial guide by myself

on Chucho BHANDARI assisted by holding retractors.  At this point appropriate 

size implants were chosen and opened.  The joint was irrigated copiously with 

pulse lavage mechanical irrigation.  The posterior capsule was infiltrated with 

local analgesic.  The wound was irrigated with pulse lavage mechanical 

irrigation.  We mixed antibiotic methylmethacrylate.  We placed the knee into 

flexion.  We placed multiple retractors assisted by Chucho BHANDARI to expose the

proximal tibia.  Once the methyl methacrylate was ready, the tibial component 

was cemented into place removing any excess methylmethacrylate form by both 

myself and Chucho BHANDARI.  The femoral component was cemented into place 

removing the removing any excess methylmethacrylate performed by both myself and

Chucho BHANDARI.  We then inserted the appropriate size polyethylene tibial 

insert.  We made sure that it was locked into position.  We took the knee into 

full extension, and then back in a flexion making sure we had removed any excess

methylmethacrylate.  The patellar component was then cemented down and secured 

with clamp.  Excess methylmethacrylate removed.  We kept the knee in full 

extension, patellar clamp in position until methylmethacrylate had hardened.  

Once it had hardened the patellar clamp was removed.  The knee was taken through

full range of motion.  The patella tracked nicely.  There was good soft tissue 

balancing.  The tourniquet was now released.  Additional hemostasis was achieved

via electrocautery.  A second gram of TXA was given.  The wound again was irr

igated with pulse lavage mechanical irrigation.  The superficial soft tissues 

were infiltrated local analgesic.  The extensor mechanism was repaired with 

Ethibond suture.  We checked the repair with range of motion and it was stable. 

The subcutaneous soft tissues were repaired with Vicryl in layers.  The skin was

approximated with pernio/Dermabond.  Sterile dressings were applied followed by 

loose web roll and Ace bandage.  The patient was transferred to a bed, and taken

to recovery in stable and satisfactory condition.  Chucho BHANDARI assisted with 

this complex procedure.

## 2022-08-01 NOTE — P.ANPRN
Procedure Note - Anesthesia





- Nerve Block Performed


  ** Right iPack Single


Time Out Performed: Yes (0943)


Date of Procedure: 08/01/22


Procedure Start Time: 09:50


Procedure Stop Time: 09:55


Location of Patient: PreOp


Indication: Acute Post-Operative Pain, Requested by Surgeon


Specifically requested for management of pain by DrGenesis: Prabhjot Lino


Sedation Type: Sedate with meaningful contact maintained


Preparation: Sterile Prep


Position: Supine


Catheter: None


Needle Types: Pajunk


Needle Gauge: 21


Ultrasound used to visualize needle placement: Yes


Ultrasound used to observe medication spread: Yes


Injectate: 0.5% Ropivacaine (see comment for volume) (15cc + 10cc nacl pf)


Blood Aspirated: No


Pain Paresthesia on Injection Noted: No


Resistance on Injection: Normal


Image Stored and Saved: Yes


Events: Uneventful and Well Tolerated

## 2023-09-05 ENCOUNTER — HOSPITAL ENCOUNTER (OUTPATIENT)
Dept: HOSPITAL 47 - RADXRMAIN | Age: 68
Discharge: HOME | End: 2023-09-05
Attending: FAMILY MEDICINE
Payer: MEDICARE

## 2023-09-05 DIAGNOSIS — M47.816: Primary | ICD-10-CM

## 2023-09-05 DIAGNOSIS — M51.36: ICD-10-CM

## 2023-09-05 PROCEDURE — 72100 X-RAY EXAM L-S SPINE 2/3 VWS: CPT

## 2023-09-05 NOTE — XR
EXAM TYPE: LUMBAR SPINE X RAY SERIES

 

COMPARISON: NONE

 

HISTORY: Obtained

 

TECHNIQUE: 3 views are submitted.

 

FINDINGS:

Alignment is anatomic.  The pedicles are intact.  The transverse processes are intact.  There is mult
ilevel moderate to severe hypertrophic and degenerative disc disease. Multilevel facet arthropathy an
d anterior spurring. Retrolisthesis of L2 relative to L3. Vacuum disc noted at multiple levels.

 

IMPRESSION:

1. No multilevel moderate to severe hypertrophic and degenerative disc disease. Suspect foraminal pro
trusion and L4-5 and L5-S1 recommend follow-up MRI.

## 2024-11-26 ENCOUNTER — HOSPITAL ENCOUNTER (OUTPATIENT)
Dept: HOSPITAL 47 - ORWHC2ENDO | Age: 69
Discharge: HOME | End: 2024-11-26
Attending: SURGERY
Payer: COMMERCIAL

## 2024-11-26 VITALS — BODY MASS INDEX: 37.5 KG/M2

## 2024-11-26 VITALS — SYSTOLIC BLOOD PRESSURE: 117 MMHG | HEART RATE: 92 BPM | DIASTOLIC BLOOD PRESSURE: 73 MMHG | RESPIRATION RATE: 18 BRPM

## 2024-11-26 VITALS — TEMPERATURE: 97.1 F

## 2024-11-26 DIAGNOSIS — D12.5: ICD-10-CM

## 2024-11-26 DIAGNOSIS — Z86.14: ICD-10-CM

## 2024-11-26 DIAGNOSIS — Z96.653: ICD-10-CM

## 2024-11-26 DIAGNOSIS — M19.90: ICD-10-CM

## 2024-11-26 DIAGNOSIS — J44.9: ICD-10-CM

## 2024-11-26 DIAGNOSIS — Z79.899: ICD-10-CM

## 2024-11-26 DIAGNOSIS — Z79.82: ICD-10-CM

## 2024-11-26 DIAGNOSIS — Z12.11: Primary | ICD-10-CM

## 2024-11-26 DIAGNOSIS — G47.30: ICD-10-CM

## 2024-11-26 DIAGNOSIS — Z79.01: ICD-10-CM

## 2024-11-26 DIAGNOSIS — I25.2: ICD-10-CM

## 2024-11-26 DIAGNOSIS — E78.5: ICD-10-CM

## 2024-11-26 DIAGNOSIS — Z95.5: ICD-10-CM

## 2024-11-26 DIAGNOSIS — D12.4: ICD-10-CM

## 2024-11-26 DIAGNOSIS — K57.30: ICD-10-CM

## 2024-11-26 DIAGNOSIS — I10: ICD-10-CM

## 2024-11-26 DIAGNOSIS — I48.91: ICD-10-CM

## 2024-11-26 PROCEDURE — 45385 COLONOSCOPY W/LESION REMOVAL: CPT

## 2024-11-26 PROCEDURE — 88305 TISSUE EXAM BY PATHOLOGIST: CPT

## 2024-11-26 RX ADMIN — POTASSIUM CHLORIDE SCH MLS/HR: 14.9 INJECTION, SOLUTION INTRAVENOUS at 11:35

## 2024-11-26 RX ADMIN — POTASSIUM CHLORIDE ONE MLS: 14.9 INJECTION, SOLUTION INTRAVENOUS at 11:35
